# Patient Record
Sex: MALE | Race: WHITE | NOT HISPANIC OR LATINO | Employment: FULL TIME | URBAN - METROPOLITAN AREA
[De-identification: names, ages, dates, MRNs, and addresses within clinical notes are randomized per-mention and may not be internally consistent; named-entity substitution may affect disease eponyms.]

---

## 2017-02-06 ENCOUNTER — TELEPHONE (OUTPATIENT)
Dept: INFECTIOUS DISEASES | Facility: CLINIC | Age: 52
End: 2017-02-06
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
## 2017-03-31 ENCOUNTER — APPOINTMENT (OUTPATIENT)
Dept: LAB | Facility: HOSPITAL | Age: 52
End: 2017-03-31

## 2017-03-31 ENCOUNTER — OFFICE VISIT (OUTPATIENT)
Dept: INFECTIOUS DISEASES | Facility: CLINIC | Age: 52
End: 2017-03-31

## 2017-03-31 VITALS
HEART RATE: 72 BPM | HEIGHT: 68 IN | SYSTOLIC BLOOD PRESSURE: 135 MMHG | TEMPERATURE: 98.2 F | DIASTOLIC BLOOD PRESSURE: 79 MMHG | WEIGHT: 302 LBS | RESPIRATION RATE: 12 BRPM | BODY MASS INDEX: 45.77 KG/M2

## 2017-03-31 DIAGNOSIS — B20 HIV (HUMAN IMMUNODEFICIENCY VIRUS INFECTION) (HCC): Primary | ICD-10-CM

## 2017-03-31 DIAGNOSIS — K92.1 HEMATOCHEZIA: ICD-10-CM

## 2017-03-31 DIAGNOSIS — E66.09 OBESITY DUE TO EXCESS CALORIES, UNSPECIFIED OBESITY SEVERITY: ICD-10-CM

## 2017-03-31 DIAGNOSIS — R79.89 LOW TESTOSTERONE LEVEL IN MALE: ICD-10-CM

## 2017-03-31 LAB
ALBUMIN SERPL-MCNC: 4.1 G/DL (ref 3.5–5.2)
ALBUMIN/GLOB SERPL: 1.2 G/DL
ALP SERPL-CCNC: 57 U/L (ref 39–117)
ALT SERPL W P-5'-P-CCNC: 118 U/L (ref 1–41)
ANION GAP SERPL CALCULATED.3IONS-SCNC: 13.5 MMOL/L
AST SERPL-CCNC: 68 U/L (ref 1–40)
BASOPHILS # BLD AUTO: 0.01 10*3/MM3 (ref 0–0.2)
BASOPHILS NFR BLD AUTO: 0.2 % (ref 0–1.5)
BILIRUB SERPL-MCNC: 0.6 MG/DL (ref 0.1–1.2)
BUN BLD-MCNC: 14 MG/DL (ref 6–20)
BUN/CREAT SERPL: 12.7 (ref 7–25)
CALCIUM SPEC-SCNC: 9.3 MG/DL (ref 8.6–10.5)
CHLORIDE SERPL-SCNC: 101 MMOL/L (ref 98–107)
CO2 SERPL-SCNC: 22.5 MMOL/L (ref 22–29)
CREAT BLD-MCNC: 1.1 MG/DL (ref 0.76–1.27)
DEPRECATED RDW RBC AUTO: 44.4 FL (ref 37–54)
EOSINOPHIL # BLD AUTO: 0.18 10*3/MM3 (ref 0–0.7)
EOSINOPHIL NFR BLD AUTO: 2.9 % (ref 0.3–6.2)
ERYTHROCYTE [DISTWIDTH] IN BLOOD BY AUTOMATED COUNT: 13.8 % (ref 11.5–14.5)
GFR SERPL CREATININE-BSD FRML MDRD: 70 ML/MIN/1.73
GLOBULIN UR ELPH-MCNC: 3.5 GM/DL
GLUCOSE BLD-MCNC: 110 MG/DL (ref 65–99)
HCT VFR BLD AUTO: 42.1 % (ref 40.4–52.2)
HGB BLD-MCNC: 14.7 G/DL (ref 13.7–17.6)
IMM GRANULOCYTES # BLD: 0 10*3/MM3 (ref 0–0.03)
IMM GRANULOCYTES NFR BLD: 0 % (ref 0–0.5)
LYMPHOCYTES # BLD AUTO: 2.72 10*3/MM3 (ref 0.9–4.8)
LYMPHOCYTES NFR BLD AUTO: 43.1 % (ref 19.6–45.3)
MCH RBC QN AUTO: 30.8 PG (ref 27–32.7)
MCHC RBC AUTO-ENTMCNC: 34.9 G/DL (ref 32.6–36.4)
MCV RBC AUTO: 88.1 FL (ref 79.8–96.2)
MONOCYTES # BLD AUTO: 0.53 10*3/MM3 (ref 0.2–1.2)
MONOCYTES NFR BLD AUTO: 8.4 % (ref 5–12)
NEUTROPHILS # BLD AUTO: 2.87 10*3/MM3 (ref 1.9–8.1)
NEUTROPHILS NFR BLD AUTO: 45.4 % (ref 42.7–76)
PLATELET # BLD AUTO: 143 10*3/MM3 (ref 140–500)
PMV BLD AUTO: 9.4 FL (ref 6–12)
POTASSIUM BLD-SCNC: 4.2 MMOL/L (ref 3.5–5.2)
PROT SERPL-MCNC: 7.6 G/DL (ref 6–8.5)
RBC # BLD AUTO: 4.78 10*6/MM3 (ref 4.6–6)
SODIUM BLD-SCNC: 137 MMOL/L (ref 136–145)
WBC NRBC COR # BLD: 6.31 10*3/MM3 (ref 4.5–10.7)

## 2017-03-31 PROCEDURE — 85025 COMPLETE CBC W/AUTO DIFF WBC: CPT | Performed by: INTERNAL MEDICINE

## 2017-03-31 PROCEDURE — 86361 T CELL ABSOLUTE COUNT: CPT | Performed by: INTERNAL MEDICINE

## 2017-03-31 PROCEDURE — 80053 COMPREHEN METABOLIC PANEL: CPT | Performed by: INTERNAL MEDICINE

## 2017-03-31 PROCEDURE — 36415 COLL VENOUS BLD VENIPUNCTURE: CPT | Performed by: INTERNAL MEDICINE

## 2017-03-31 PROCEDURE — 87536 HIV-1 QUANT&REVRSE TRNSCRPJ: CPT | Performed by: INTERNAL MEDICINE

## 2017-03-31 PROCEDURE — 99214 OFFICE O/P EST MOD 30 MIN: CPT | Performed by: INTERNAL MEDICINE

## 2017-03-31 RX ORDER — SULFAMETHOXAZOLE AND TRIMETHOPRIM 400; 80 MG/1; MG/1
1 TABLET ORAL DAILY
COMMUNITY
End: 2017-07-31 | Stop reason: SDUPTHER

## 2017-03-31 NOTE — PROGRESS NOTES
Referring Provider: CELY Deras MD    Reason for Consultation: HIV    History of present illness:  Cornelio is a very nice 51 YOM here for f/u  HIV care. After last visit we switched him over to Truvada and dolutegravir. He is tolerating it well. He reports only 3 doses in the past 3 months. His reason for missing is falling asleep. I encouraged him to take it in the morning instead.    He continues to take the Bactrim w/o side effects.     He remains in his relationship w/ HIV - partner but they are not sexually active.    He continues to have some blood in the stool and will plan to see Dr Shaw soon.    He would like to lose some weight.    PMH:  HIV/AIDS on ART  Kaposi's sarcoma s/p chemo  Hematochezia  OA  MIAN w/ CPAP  Cellulitis of legs x 2  Obesity    PSH:  [place] tooth extraction    Social History:  No smoking  No ETOH  No illicits  Works in retail store  Has 2 dogs at home  Enjoys walking and jogging for exercise when the weather is better  No recent travel  MSM in relationship x 9 years (not sexually active at this time)    Family History:  Mom: breast cancer  Dad: MI    Allergies:  NKDA    Medications:    Current Outpatient Prescriptions:   •  dolutegravir (TIVICAY) 50 MG tablet, Take 1 tablet by mouth Daily., Disp: 30 tablet, Rfl: 5  •  emtricitabine-tenofovir (TRUVADA) 200-300 MG per tablet, Take 1 tablet by mouth daily., Disp: 90 tablet, Rfl: 1  •  sulfamethoxazole-trimethoprim (BACTRIM,SEPTRA) 400-80 MG tablet, Take 1 tablet by mouth Daily., Disp: , Rfl:   •  vitamin D (ERGOCALCIFEROL) 56224 UNITS capsule capsule, Take 50,000 Units by mouth 1 (one) time per week., Disp: , Rfl:       Review of Systems  All systems were reviewed and are negative unless otherwise stated above in the HPI    Objective   Vital Signs   Temp:  [98.2 °F (36.8 °C)] 98.2 °F (36.8 °C)  Heart Rate:  [72] 72  Resp:  [12] 12  BP: (135)/(79) 135/79    Physical Exam:   General: awake, alert, NAD, very nice   Head: Normocephalic  Eyes:  PERRL, EOMI, no scleral icterus  ENT: MMM, OP clear, no thrush. Good dentition. Large tonsils  Neck: Supple  Cardiovascular: NR, RR, no murmurs, rubs, or gallops; no LE edema  Respiratory: Lungs are clear to ascultation bilaterally, no rales or wheezing; normal work of breathing on ambient air  GI: Abdomen is obese, soft, non-tender, non-distended,  Musculoskeletal: no joint abnormalities, normal musculature  Skin: No rashes, lesions, or embolic phenomenon  Neurological: Alert and oriented x 3,  motor strength 5/5 in all four extremities  Psychiatric: Normal mood and affect   Vasc: no cyanosis    Labs:   CBC, CMP, HIV labs reviewed today  Lab Results   Component Value Date    WBC 6.5 12/29/2016    HGB 15.1 12/29/2016    HCT 43.0 12/29/2016    MCV 87 12/29/2016     12/29/2016     No results found for: GLUCOSE, BUN, CREATININE, EGFRIFNONA, EGFRIFAFRI, BCR, K, CO2, CALCIUM, PROTENTOTREF, ALBUMIN, LABIL2, AST, ALT    TSH 3.3    HIV Labs:  CD4 - 168/7% (12/2016)  VL - 10,740 (12/2016)  Genotype - wild-type (12/2016)  Hep A naive (12/2016)  Hep B sAg - negative (10/2016)  Hep B sAB negative (12/2016)  Hep C Ab - negative (10/2016)  Tspot negative (12/2016)  RPR nonreactive (12/2016)    Assessment/Plan   1. HIV  -continue Truvada and dolutegravir; stresses 100% compliance and taking medications in AM  -repeat CD4 and VL today along with CBC and CMP  -continue Truvada 200-300 mg PO daily  -encouraged Twinrix vaccines as well as Prevnar-13 and PVX-23 8 weeks later at PCP or pharmacy  -we discussed living with HIV and natural history of disease. We discussed goals of treatment and need for 100% compliance with medication and continued close follow-up. We discussed need for frequent lab monitoring and laboratory goals. We discussed risk of transmission, need to disclose status to partners, and staying safe.    2. Obesity  -encouraged weight loss  -TSH normal last visit    3. Hematochezia  -I agree w/ GI/surgery  follow-up; discussed importance w/ patient    4. Low testosterone  -his testosterone was low when checked in October  -patients with HIV are known to be predisposed to low testosterone; will ultimately defer to PCP but my recommendation would be that it is indicated in his situation      Thank you for this consult. RTC 4 months or sooner if needed.

## 2017-04-01 LAB
BASOPHILS # BLD AUTO: 0 X10E3/UL (ref 0–0.2)
BASOPHILS NFR BLD AUTO: 0 %
CD3+CD4+ CELLS # BLD: 194 /UL (ref 359–1519)
CD3+CD4+ CELLS NFR BLD: 7.2 % (ref 30.8–58.5)
EOSINOPHIL # BLD AUTO: 0.2 X10E3/UL (ref 0–0.4)
EOSINOPHIL # BLD AUTO: 3 %
ERYTHROCYTE [DISTWIDTH] IN BLOOD BY AUTOMATED COUNT: 14 % (ref 12.3–15.4)
HCT VFR BLD AUTO: 42 % (ref 37.5–51)
HGB BLD-MCNC: 14.7 G/DL (ref 12.6–17.7)
HIV1 RNA # SERPL NAA+PROBE: <20 COPIES/ML
IMM GRANULOCYTES # BLD: 0 X10E3/UL (ref 0–0.1)
IMM GRANULOCYTES NFR BLD: 0 %
LOG10 HIV-1 RNA: NORMAL LOG10COPY/ML
LYMPHOCYTES # BLD AUTO: 2.7 X10E3/UL (ref 0.7–3.1)
LYMPHOCYTES NFR BLD AUTO: 43 %
MCH RBC QN AUTO: 30.4 PG (ref 26.6–33)
MCHC RBC AUTO-ENTMCNC: 35 G/DL (ref 31.5–35.7)
MCV RBC AUTO: 87 FL (ref 79–97)
MONOCYTES # BLD AUTO: 0.5 X10E3/UL (ref 0.1–0.9)
MONOCYTES NFR BLD AUTO: 8 %
NEUTROPHILS # BLD AUTO: 2.9 X10E3/UL (ref 1.4–7)
NEUTROPHILS NFR BLD AUTO: 46 %
PLATELET # BLD AUTO: 158 X10E3/UL (ref 150–379)
RBC # BLD AUTO: 4.83 X10E6/UL (ref 4.14–5.8)
WBC # BLD AUTO: 6.3 X10E3/UL (ref 3.4–10.8)

## 2017-07-05 ENCOUNTER — RESULTS ENCOUNTER (OUTPATIENT)
Dept: INFECTIOUS DISEASES | Facility: CLINIC | Age: 52
End: 2017-07-05

## 2017-07-05 DIAGNOSIS — B20 HIV (HUMAN IMMUNODEFICIENCY VIRUS INFECTION) (HCC): Primary | ICD-10-CM

## 2017-07-05 DIAGNOSIS — B20 HIV (HUMAN IMMUNODEFICIENCY VIRUS INFECTION) (HCC): ICD-10-CM

## 2017-07-19 ENCOUNTER — LAB (OUTPATIENT)
Dept: LAB | Facility: HOSPITAL | Age: 52
End: 2017-07-19

## 2017-07-19 DIAGNOSIS — B20 HIV (HUMAN IMMUNODEFICIENCY VIRUS INFECTION) (HCC): ICD-10-CM

## 2017-07-19 LAB
ALBUMIN SERPL-MCNC: 3.9 G/DL (ref 3.5–5.2)
ALBUMIN/GLOB SERPL: 1.1 G/DL
ALP SERPL-CCNC: 59 U/L (ref 39–117)
ALT SERPL W P-5'-P-CCNC: 108 U/L (ref 1–41)
ANION GAP SERPL CALCULATED.3IONS-SCNC: 11.9 MMOL/L
AST SERPL-CCNC: 91 U/L (ref 1–40)
BASOPHILS # BLD AUTO: 0.02 10*3/MM3 (ref 0–0.2)
BASOPHILS NFR BLD AUTO: 0.3 % (ref 0–1.5)
BILIRUB SERPL-MCNC: 0.5 MG/DL (ref 0.1–1.2)
BILIRUB UR QL STRIP: NEGATIVE
BUN BLD-MCNC: 9 MG/DL (ref 6–20)
BUN/CREAT SERPL: 9.9 (ref 7–25)
CALCIUM SPEC-SCNC: 9.4 MG/DL (ref 8.6–10.5)
CHLORIDE SERPL-SCNC: 101 MMOL/L (ref 98–107)
CLARITY UR: CLEAR
CO2 SERPL-SCNC: 24.1 MMOL/L (ref 22–29)
COLOR UR: YELLOW
CREAT BLD-MCNC: 0.91 MG/DL (ref 0.76–1.27)
DEPRECATED RDW RBC AUTO: 45.8 FL (ref 37–54)
EOSINOPHIL # BLD AUTO: 0.28 10*3/MM3 (ref 0–0.7)
EOSINOPHIL NFR BLD AUTO: 4.4 % (ref 0.3–6.2)
ERYTHROCYTE [DISTWIDTH] IN BLOOD BY AUTOMATED COUNT: 13.8 % (ref 11.5–14.5)
GFR SERPL CREATININE-BSD FRML MDRD: 87 ML/MIN/1.73
GLOBULIN UR ELPH-MCNC: 3.5 GM/DL
GLUCOSE BLD-MCNC: 108 MG/DL (ref 65–99)
GLUCOSE UR STRIP-MCNC: NEGATIVE MG/DL
HBV SURFACE AB SER RIA-ACNC: NORMAL
HBV SURFACE AG SERPL QL IA: NORMAL
HCT VFR BLD AUTO: 43.6 % (ref 40.4–52.2)
HCV AB SER DONR QL: NORMAL
HGB BLD-MCNC: 14.9 G/DL (ref 13.7–17.6)
HGB UR QL STRIP.AUTO: NEGATIVE
IMM GRANULOCYTES # BLD: 0 10*3/MM3 (ref 0–0.03)
IMM GRANULOCYTES NFR BLD: 0 % (ref 0–0.5)
KETONES UR QL STRIP: NEGATIVE
LEUKOCYTE ESTERASE UR QL STRIP.AUTO: NEGATIVE
LYMPHOCYTES # BLD AUTO: 2.65 10*3/MM3 (ref 0.9–4.8)
LYMPHOCYTES NFR BLD AUTO: 42 % (ref 19.6–45.3)
MCH RBC QN AUTO: 31.2 PG (ref 27–32.7)
MCHC RBC AUTO-ENTMCNC: 34.2 G/DL (ref 32.6–36.4)
MCV RBC AUTO: 91.4 FL (ref 79.8–96.2)
MONOCYTES # BLD AUTO: 0.71 10*3/MM3 (ref 0.2–1.2)
MONOCYTES NFR BLD AUTO: 11.3 % (ref 5–12)
NEUTROPHILS # BLD AUTO: 2.65 10*3/MM3 (ref 1.9–8.1)
NEUTROPHILS NFR BLD AUTO: 42 % (ref 42.7–76)
NITRITE UR QL STRIP: NEGATIVE
PH UR STRIP.AUTO: 5.5 [PH] (ref 5–8)
PLATELET # BLD AUTO: 138 10*3/MM3 (ref 140–500)
PMV BLD AUTO: 10.2 FL (ref 6–12)
POTASSIUM BLD-SCNC: 4.1 MMOL/L (ref 3.5–5.2)
PROT SERPL-MCNC: 7.4 G/DL (ref 6–8.5)
PROT UR QL STRIP: NEGATIVE
RBC # BLD AUTO: 4.77 10*6/MM3 (ref 4.6–6)
SODIUM BLD-SCNC: 137 MMOL/L (ref 136–145)
SP GR UR STRIP: 1.02 (ref 1–1.03)
UROBILINOGEN UR QL STRIP: NORMAL
WBC NRBC COR # BLD: 6.31 10*3/MM3 (ref 4.5–10.7)

## 2017-07-19 PROCEDURE — 86706 HEP B SURFACE ANTIBODY: CPT

## 2017-07-19 PROCEDURE — 86361 T CELL ABSOLUTE COUNT: CPT

## 2017-07-19 PROCEDURE — 87536 HIV-1 QUANT&REVRSE TRNSCRPJ: CPT | Performed by: INTERNAL MEDICINE

## 2017-07-19 PROCEDURE — 86704 HEP B CORE ANTIBODY TOTAL: CPT

## 2017-07-19 PROCEDURE — 86708 HEPATITIS A ANTIBODY: CPT

## 2017-07-19 PROCEDURE — 85025 COMPLETE CBC W/AUTO DIFF WBC: CPT

## 2017-07-19 PROCEDURE — 86803 HEPATITIS C AB TEST: CPT

## 2017-07-19 PROCEDURE — 80053 COMPREHEN METABOLIC PANEL: CPT

## 2017-07-19 PROCEDURE — 81003 URINALYSIS AUTO W/O SCOPE: CPT

## 2017-07-19 PROCEDURE — 36415 COLL VENOUS BLD VENIPUNCTURE: CPT | Performed by: INTERNAL MEDICINE

## 2017-07-19 PROCEDURE — 87340 HEPATITIS B SURFACE AG IA: CPT

## 2017-07-20 ENCOUNTER — TELEPHONE (OUTPATIENT)
Dept: INFECTIOUS DISEASES | Facility: CLINIC | Age: 52
End: 2017-07-20

## 2017-07-20 LAB
BASOPHILS # BLD AUTO: 0 X10E3/UL (ref 0–0.2)
BASOPHILS NFR BLD AUTO: 0 %
CD3+CD4+ CELLS # BLD: 208 /UL (ref 359–1519)
CD3+CD4+ CELLS NFR BLD: 7.7 % (ref 30.8–58.5)
EOSINOPHIL # BLD AUTO: 0.2 X10E3/UL (ref 0–0.4)
EOSINOPHIL # BLD AUTO: 4 %
ERYTHROCYTE [DISTWIDTH] IN BLOOD BY AUTOMATED COUNT: 14.8 % (ref 12.3–15.4)
HAV AB SER QL IA: NEGATIVE
HBV CORE AB SER DONR QL IA: NEGATIVE
HCT VFR BLD AUTO: 43 % (ref 37.5–51)
HGB BLD-MCNC: 14.6 G/DL (ref 12.6–17.7)
IMM GRANULOCYTES # BLD: 0 X10E3/UL (ref 0–0.1)
IMM GRANULOCYTES NFR BLD: 0 %
LYMPHOCYTES # BLD AUTO: 2.7 X10E3/UL (ref 0.7–3.1)
LYMPHOCYTES NFR BLD AUTO: 45 %
MCH RBC QN AUTO: 29.6 PG (ref 26.6–33)
MCHC RBC AUTO-ENTMCNC: 34 G/DL (ref 31.5–35.7)
MCV RBC AUTO: 87 FL (ref 79–97)
MONOCYTES # BLD AUTO: 0.5 X10E3/UL (ref 0.1–0.9)
MONOCYTES NFR BLD AUTO: 9 %
NEUTROPHILS # BLD AUTO: 2.6 X10E3/UL (ref 1.4–7)
NEUTROPHILS NFR BLD AUTO: 42 %
PLATELET # BLD AUTO: 154 X10E3/UL (ref 150–379)
RBC # BLD AUTO: 4.93 X10E6/UL (ref 4.14–5.8)
WBC # BLD AUTO: 6.1 X10E3/UL (ref 3.4–10.8)

## 2017-07-20 NOTE — TELEPHONE ENCOUNTER
----- Message from Andrea Holm MD sent at 7/20/2017  7:47 AM EDT -----  Regarding: Labs  Please let patient know his hepatitis B test was negative. We will discuss vaccination at his next visit.       ----- Message -----     From: Lab, Background User     Sent: 7/19/2017  11:21 AM       To: Andrea Holm MD

## 2017-07-21 LAB
HIV1 RNA # SERPL NAA+PROBE: <20 COPIES/ML
LOG10 HIV-1 RNA: NORMAL LOG10COPY/ML

## 2017-07-31 ENCOUNTER — OFFICE VISIT (OUTPATIENT)
Dept: INFECTIOUS DISEASES | Facility: CLINIC | Age: 52
End: 2017-07-31

## 2017-07-31 VITALS
TEMPERATURE: 98.7 F | HEART RATE: 83 BPM | DIASTOLIC BLOOD PRESSURE: 84 MMHG | WEIGHT: 299 LBS | HEIGHT: 68 IN | BODY MASS INDEX: 45.31 KG/M2 | SYSTOLIC BLOOD PRESSURE: 142 MMHG

## 2017-07-31 DIAGNOSIS — R79.89 LOW TESTOSTERONE LEVEL IN MALE: ICD-10-CM

## 2017-07-31 DIAGNOSIS — B20 HIV (HUMAN IMMUNODEFICIENCY VIRUS INFECTION) (HCC): Primary | ICD-10-CM

## 2017-07-31 DIAGNOSIS — K92.1 HEMATOCHEZIA: ICD-10-CM

## 2017-07-31 DIAGNOSIS — K75.9 HEPATITIS: ICD-10-CM

## 2017-07-31 DIAGNOSIS — E66.09 OBESITY DUE TO EXCESS CALORIES, UNSPECIFIED OBESITY SEVERITY: ICD-10-CM

## 2017-07-31 PROCEDURE — 99214 OFFICE O/P EST MOD 30 MIN: CPT | Performed by: INTERNAL MEDICINE

## 2017-07-31 RX ORDER — SULFAMETHOXAZOLE AND TRIMETHOPRIM 400; 80 MG/1; MG/1
1 TABLET ORAL DAILY
Qty: 30 TABLET | Refills: 5 | Status: SHIPPED | OUTPATIENT
Start: 2017-07-31 | End: 2017-08-30
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
## 2017-08-01 RX ORDER — DOLUTEGRAVIR SODIUM 50 MG/1
TABLET, FILM COATED ORAL
Qty: 30 TABLET | Refills: 5 | Status: SHIPPED | OUTPATIENT
Start: 2017-08-01 | End: 2018-04-05 | Stop reason: SDUPTHER

## 2017-08-08 ENCOUNTER — TELEPHONE (OUTPATIENT)
Dept: INFECTIOUS DISEASES | Facility: CLINIC | Age: 52
End: 2017-08-08

## 2017-08-08 NOTE — TELEPHONE ENCOUNTER
I spoke with patient regarding his ultrasound appointment.  I informed him that the scheduling dept had tried to contact him on three different occasions without success.  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

## 2017-08-14 ENCOUNTER — TELEPHONE (OUTPATIENT)
Dept: INFECTIOUS DISEASES | Facility: CLINIC | Age: 52
End: 2017-08-14

## 2017-08-14 NOTE — TELEPHONE ENCOUNTER
Please let patient know that we are obtaining the ultrasound to see if he has any liver disease present. Though his liver tests may be due to fatty liver disease, it is important to make sure that there is not another cause and to make sure he is not developing cirrhosis which can be a complication of fatty liver disease.

## 2017-08-14 NOTE — TELEPHONE ENCOUNTER
Patient called in hoping to speak to you regarding his ultrasound you would like him to have done. He is wondering if it being done for the reason he sees you or for some other reason. Thanks. Krysta

## 2017-08-15 NOTE — TELEPHONE ENCOUNTER
I called patient and left him a message RE: Dr. Holm's note below. Just asked him to call us to go over the message info and to speak to Tiffany Jeffers when he calls because she has been trying to reach him RE: setting up the Ultrasound appt.--DOROTHY. RN

## 2017-08-30 ENCOUNTER — HOSPITAL ENCOUNTER (OUTPATIENT)
Dept: ULTRASOUND IMAGING | Facility: HOSPITAL | Age: 52
Discharge: HOME | End: 2017-08-30
Attending: INTERNAL MEDICINE | Admitting: INTERNAL MEDICINE

## 2017-08-30 DIAGNOSIS — K75.9 HEPATITIS: ICD-10-CM

## 2017-08-30 PROCEDURE — 76705 ECHO EXAM OF ABDOMEN: CPT

## 2017-09-05 ENCOUNTER — TELEPHONE (OUTPATIENT)
Dept: INFECTIOUS DISEASES | Facility: CLINIC | Age: 52
End: 2017-09-05

## 2017-09-05 NOTE — TELEPHONE ENCOUNTER
----- Message from Andrea Holm MD sent at 9/4/2017 10:18 AM EDT -----  Please let patient know liver ultrasound shows mild fatty liver disease as we expected. I do not think he needs to see a liver specialist.

## 2017-11-28 ENCOUNTER — TELEPHONE (OUTPATIENT)
Dept: INFECTIOUS DISEASES | Facility: CLINIC | Age: 52
End: 2017-11-28

## 2017-11-28 NOTE — TELEPHONE ENCOUNTER
Patient called in and was needing to reschedule, he is now off on Wednesdays, you had a couple of Wednesday's open this month but I know that's not a normal day for you so I wanted to ask before scheduling him. Also, he wanted to know if you were going to do labs/urine on the day of his appt. Thanks. Krysta

## 2017-11-28 NOTE — TELEPHONE ENCOUNTER
12/13 at 1:10 or 12/27 at 1:30 would work. Yes, he will be providing blood and urine but does NOT need to be fasting.

## 2017-11-28 NOTE — TELEPHONE ENCOUNTER
Rescheduled for 12/27 @ 1:30. Let him know blood/urine species would be expected to be collected. Krysta

## 2017-12-21 RX ORDER — SULFAMETHOXAZOLE AND TRIMETHOPRIM 400; 80 MG/1; MG/1
TABLET ORAL
Qty: 30 TABLET | Refills: 3 | Status: SHIPPED | OUTPATIENT
Start: 2017-12-21 | End: 2018-01-20

## 2017-12-27 ENCOUNTER — OFFICE VISIT (OUTPATIENT)
Dept: INFECTIOUS DISEASES | Facility: CLINIC | Age: 52
End: 2017-12-27

## 2017-12-27 ENCOUNTER — APPOINTMENT (OUTPATIENT)
Dept: LAB | Facility: HOSPITAL | Age: 52
End: 2017-12-27

## 2017-12-27 VITALS
TEMPERATURE: 98.1 F | HEIGHT: 68 IN | BODY MASS INDEX: 47.47 KG/M2 | HEART RATE: 71 BPM | SYSTOLIC BLOOD PRESSURE: 137 MMHG | WEIGHT: 313.2 LBS | DIASTOLIC BLOOD PRESSURE: 84 MMHG

## 2017-12-27 DIAGNOSIS — B20 HIV (HUMAN IMMUNODEFICIENCY VIRUS INFECTION) (HCC): Primary | ICD-10-CM

## 2017-12-27 DIAGNOSIS — K75.9 HEPATITIS: ICD-10-CM

## 2017-12-27 DIAGNOSIS — K76.0 FATTY LIVER: ICD-10-CM

## 2017-12-27 DIAGNOSIS — R79.89 LOW TESTOSTERONE IN MALE: ICD-10-CM

## 2017-12-27 DIAGNOSIS — K92.1 HEMATOCHEZIA: ICD-10-CM

## 2017-12-27 DIAGNOSIS — E66.09 CLASS 2 OBESITY DUE TO EXCESS CALORIES WITHOUT SERIOUS COMORBIDITY WITH BODY MASS INDEX (BMI) OF 35.0 TO 35.9 IN ADULT: ICD-10-CM

## 2017-12-27 DIAGNOSIS — R79.89 LOW SERUM VITAMIN D: ICD-10-CM

## 2017-12-27 LAB
25(OH)D3 SERPL-MCNC: 18.4 NG/ML (ref 30–100)
ALBUMIN SERPL-MCNC: 4.3 G/DL (ref 3.5–5.2)
ALBUMIN/GLOB SERPL: 1.1 G/DL
ALP SERPL-CCNC: 58 U/L (ref 39–117)
ALT SERPL W P-5'-P-CCNC: 90 U/L (ref 1–41)
ANION GAP SERPL CALCULATED.3IONS-SCNC: 11.3 MMOL/L
AST SERPL-CCNC: 81 U/L (ref 1–40)
BASOPHILS # BLD AUTO: 0.02 10*3/MM3 (ref 0–0.2)
BASOPHILS NFR BLD AUTO: 0.3 % (ref 0–1.5)
BILIRUB SERPL-MCNC: 0.6 MG/DL (ref 0.1–1.2)
BUN BLD-MCNC: 12 MG/DL (ref 6–20)
BUN/CREAT SERPL: 11.8 (ref 7–25)
CALCIUM SPEC-SCNC: 9.4 MG/DL (ref 8.6–10.5)
CHLORIDE SERPL-SCNC: 101 MMOL/L (ref 98–107)
CO2 SERPL-SCNC: 25.7 MMOL/L (ref 22–29)
CREAT BLD-MCNC: 1.02 MG/DL (ref 0.76–1.27)
DEPRECATED RDW RBC AUTO: 45.1 FL (ref 37–54)
EOSINOPHIL # BLD AUTO: 0.19 10*3/MM3 (ref 0–0.7)
EOSINOPHIL NFR BLD AUTO: 2.8 % (ref 0.3–6.2)
ERYTHROCYTE [DISTWIDTH] IN BLOOD BY AUTOMATED COUNT: 13.9 % (ref 11.5–14.5)
GFR SERPL CREATININE-BSD FRML MDRD: 76 ML/MIN/1.73
GLOBULIN UR ELPH-MCNC: 3.8 GM/DL
GLUCOSE BLD-MCNC: 96 MG/DL (ref 65–99)
HCT VFR BLD AUTO: 43.2 % (ref 40.4–52.2)
HGB BLD-MCNC: 15 G/DL (ref 13.7–17.6)
IMM GRANULOCYTES # BLD: 0 10*3/MM3 (ref 0–0.03)
IMM GRANULOCYTES NFR BLD: 0 % (ref 0–0.5)
LYMPHOCYTES # BLD AUTO: 3.01 10*3/MM3 (ref 0.9–4.8)
LYMPHOCYTES NFR BLD AUTO: 44.5 % (ref 19.6–45.3)
MCH RBC QN AUTO: 31.3 PG (ref 27–32.7)
MCHC RBC AUTO-ENTMCNC: 34.7 G/DL (ref 32.6–36.4)
MCV RBC AUTO: 90 FL (ref 79.8–96.2)
MONOCYTES # BLD AUTO: 0.54 10*3/MM3 (ref 0.2–1.2)
MONOCYTES NFR BLD AUTO: 8 % (ref 5–12)
NEUTROPHILS # BLD AUTO: 3 10*3/MM3 (ref 1.9–8.1)
NEUTROPHILS NFR BLD AUTO: 44.4 % (ref 42.7–76)
PLATELET # BLD AUTO: 154 10*3/MM3 (ref 140–500)
PMV BLD AUTO: 9.9 FL (ref 6–12)
POTASSIUM BLD-SCNC: 4.2 MMOL/L (ref 3.5–5.2)
PROT SERPL-MCNC: 8.1 G/DL (ref 6–8.5)
RBC # BLD AUTO: 4.8 10*6/MM3 (ref 4.6–6)
SODIUM BLD-SCNC: 138 MMOL/L (ref 136–145)
WBC NRBC COR # BLD: 6.76 10*3/MM3 (ref 4.5–10.7)

## 2017-12-27 PROCEDURE — 86361 T CELL ABSOLUTE COUNT: CPT | Performed by: INTERNAL MEDICINE

## 2017-12-27 PROCEDURE — 86481 TB AG RESPONSE T-CELL SUSP: CPT | Performed by: INTERNAL MEDICINE

## 2017-12-27 PROCEDURE — 36415 COLL VENOUS BLD VENIPUNCTURE: CPT | Performed by: INTERNAL MEDICINE

## 2017-12-27 PROCEDURE — 99214 OFFICE O/P EST MOD 30 MIN: CPT | Performed by: INTERNAL MEDICINE

## 2017-12-27 PROCEDURE — 82306 VITAMIN D 25 HYDROXY: CPT | Performed by: INTERNAL MEDICINE

## 2017-12-27 PROCEDURE — 80053 COMPREHEN METABOLIC PANEL: CPT | Performed by: INTERNAL MEDICINE

## 2017-12-27 PROCEDURE — 86592 SYPHILIS TEST NON-TREP QUAL: CPT | Performed by: INTERNAL MEDICINE

## 2017-12-27 PROCEDURE — 85025 COMPLETE CBC W/AUTO DIFF WBC: CPT | Performed by: INTERNAL MEDICINE

## 2017-12-27 RX ORDER — FLUTICASONE PROPIONATE 50 MCG
2 SPRAY, SUSPENSION (ML) NASAL DAILY
Qty: 1 BOTTLE | Refills: 3 | Status: SHIPPED | OUTPATIENT
Start: 2017-12-27 | End: 2018-01-26
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
## 2017-12-28 LAB
BASOPHILS # BLD AUTO: 0 X10E3/UL (ref 0–0.2)
BASOPHILS NFR BLD AUTO: 0 %
CD3+CD4+ CELLS # BLD: 264 /UL (ref 359–1519)
CD3+CD4+ CELLS NFR BLD: 8.5 % (ref 30.8–58.5)
EOSINOPHIL # BLD AUTO: 0.2 X10E3/UL (ref 0–0.4)
EOSINOPHIL # BLD AUTO: 3 %
ERYTHROCYTE [DISTWIDTH] IN BLOOD BY AUTOMATED COUNT: 13.8 % (ref 12.3–15.4)
HCT VFR BLD AUTO: 43.4 % (ref 37.5–51)
HGB BLD-MCNC: 15.2 G/DL (ref 13–17.7)
IMM GRANULOCYTES # BLD: 0 X10E3/UL (ref 0–0.1)
IMM GRANULOCYTES NFR BLD: 0 %
LYMPHOCYTES # BLD AUTO: 3.1 X10E3/UL (ref 0.7–3.1)
LYMPHOCYTES NFR BLD AUTO: 45 %
MCH RBC QN AUTO: 31.1 PG (ref 26.6–33)
MCHC RBC AUTO-ENTMCNC: 35 G/DL (ref 31.5–35.7)
MCV RBC AUTO: 89 FL (ref 79–97)
MONOCYTES # BLD AUTO: 0.5 X10E3/UL (ref 0.1–0.9)
MONOCYTES NFR BLD AUTO: 7 %
NEUTROPHILS # BLD AUTO: 3.1 X10E3/UL (ref 1.4–7)
NEUTROPHILS NFR BLD AUTO: 45 %
PLATELET # BLD AUTO: 164 X10E3/UL (ref 150–379)
RBC # BLD AUTO: 4.89 X10E6/UL (ref 4.14–5.8)
RPR SER QL: NORMAL
WBC # BLD AUTO: 6.9 X10E3/UL (ref 3.4–10.8)

## 2017-12-28 PROCEDURE — 90632 HEPA VACCINE ADULT IM: CPT | Performed by: INTERNAL MEDICINE

## 2017-12-28 PROCEDURE — 90746 HEPB VACCINE 3 DOSE ADULT IM: CPT | Performed by: INTERNAL MEDICINE

## 2017-12-28 PROCEDURE — 90471 IMMUNIZATION ADMIN: CPT | Performed by: INTERNAL MEDICINE

## 2017-12-28 PROCEDURE — 90472 IMMUNIZATION ADMIN EACH ADD: CPT | Performed by: INTERNAL MEDICINE

## 2017-12-29 ENCOUNTER — RESULTS ENCOUNTER (OUTPATIENT)
Dept: INFECTIOUS DISEASES | Facility: CLINIC | Age: 52
End: 2017-12-29

## 2017-12-29 DIAGNOSIS — B20 HIV (HUMAN IMMUNODEFICIENCY VIRUS INFECTION) (HCC): Primary | ICD-10-CM

## 2017-12-29 DIAGNOSIS — B20 HIV (HUMAN IMMUNODEFICIENCY VIRUS INFECTION) (HCC): ICD-10-CM

## 2017-12-29 LAB
TSPOT INTERPRETATION: NEGATIVE
TSPOT NIL CONTROL: 0
TSPOT PANEL A: 0
TSPOT PANEL B: 0
TSPOT POS CONTROL: 84

## 2018-01-18 ENCOUNTER — TELEPHONE (OUTPATIENT)
Dept: INFECTIOUS DISEASES | Facility: CLINIC | Age: 53
End: 2018-01-18

## 2018-01-18 NOTE — TELEPHONE ENCOUNTER
Patient called back to office stated that he would call and make appointment this week with gastro.

## 2018-01-18 NOTE — TELEPHONE ENCOUNTER
LVM for patient to please contact our office.  I need to know if the patient would like a referral to gasto or would choose not to be seen by gastro.  The gastro office has left the patient 3 separate voice messages in order to try to schedule him for an appointment.  Gastro's number is [phone]

## 2018-01-26 ENCOUNTER — TELEPHONE (OUTPATIENT)
Dept: INFECTIOUS DISEASES | Facility: CLINIC | Age: 53
End: 2018-01-26

## 2018-01-26 NOTE — TELEPHONE ENCOUNTER
Canceled patients referral to gastro at this time;  Gastro attempted 3 x's to contact patient and pt never called to schedule.

## 2018-02-13 ENCOUNTER — TELEPHONE (OUTPATIENT)
Dept: INFECTIOUS DISEASES | Facility: CLINIC | Age: 53
End: 2018-02-13

## 2018-02-13 NOTE — TELEPHONE ENCOUNTER
LVM reminding him that he was due for his 2nd Hep B injection on 01/27 and to please call our office to schedule a nurses appt in order to stay current with the Hep B schedule.

## 2018-02-15 ENCOUNTER — CLINICAL SUPPORT (OUTPATIENT)
Dept: INFECTIOUS DISEASES | Facility: CLINIC | Age: 53
End: 2018-02-15

## 2018-02-15 ENCOUNTER — APPOINTMENT (OUTPATIENT)
Dept: LAB | Facility: HOSPITAL | Age: 53
End: 2018-02-15

## 2018-02-15 PROCEDURE — 36415 COLL VENOUS BLD VENIPUNCTURE: CPT | Performed by: INTERNAL MEDICINE

## 2018-02-15 PROCEDURE — 90746 HEPB VACCINE 3 DOSE ADULT IM: CPT | Performed by: INTERNAL MEDICINE

## 2018-02-15 PROCEDURE — 90471 IMMUNIZATION ADMIN: CPT | Performed by: INTERNAL MEDICINE

## 2018-02-15 PROCEDURE — 87536 HIV-1 QUANT&REVRSE TRNSCRPJ: CPT | Performed by: INTERNAL MEDICINE

## 2018-02-19 LAB
HIV1 RNA # SERPL NAA+PROBE: <20 COPIES/ML
LOG10 HIV-1 RNA: NORMAL LOG10COPY/ML

## 2018-03-29 ENCOUNTER — TELEPHONE (OUTPATIENT)
Dept: INFECTIOUS DISEASES | Facility: CLINIC | Age: 53
End: 2018-03-29

## 2018-03-29 NOTE — TELEPHONE ENCOUNTER
Patient called in and stated that over the course of the past several days he has been experiencing some light headedness and wanted to check to make sure it maybe wasn't a side effect he may be experiencing from his medication you have prescribed or if he needs to follow up with his PCP? No other symptoms to report. Thanks. Krysta

## 2018-03-29 NOTE — TELEPHONE ENCOUNTER
Please tell him thanks for letting me know. However, I'm not familiar with his HIV meds causing light headedness. I would recommend seeing his PCP about it as you mentioned.

## 2018-03-30 ENCOUNTER — TELEPHONE (OUTPATIENT)
Dept: INFECTIOUS DISEASES | Facility: CLINIC | Age: 53
End: 2018-03-30

## 2018-03-30 NOTE — TELEPHONE ENCOUNTER
Phone with patient, He called us back and said he will contact PCP for dizziness episodes although they are somewhat better. I informed him, per Dr. Holm, that the med's we have him on do NOT generally cause dizziness. Jayla Deutsch RN

## 2018-03-30 NOTE — TELEPHONE ENCOUNTER
Have left numerous messages for Mr. Viera to call us back but so far, we have not heard back from him. Will await call from patient. Jayla Deutsch RN

## 2018-04-05 RX ORDER — DOLUTEGRAVIR SODIUM 50 MG/1
TABLET, FILM COATED ORAL
Qty: 30 TABLET | Refills: 5 | Status: SHIPPED | OUTPATIENT
Start: 2018-04-05 | End: 2019-01-24 | Stop reason: SDUPTHER

## 2018-04-05 RX ORDER — EMTRICITABINE AND TENOFOVIR ALAFENAMIDE 200; 25 MG/1; MG/1
TABLET ORAL
Qty: 30 TABLET | Refills: 5 | Status: SHIPPED | OUTPATIENT
Start: 2018-04-05 | End: 2019-01-24 | Stop reason: SDUPTHER

## 2018-06-19 ENCOUNTER — TELEPHONE (OUTPATIENT)
Dept: INFECTIOUS DISEASES | Facility: CLINIC | Age: 53
End: 2018-06-19

## 2018-06-19 NOTE — TELEPHONE ENCOUNTER
LVM informing pt that he will be due the week of 06/25 for final vaccines of Hep A & B series; please call and inform our FOC what day he will be coming, so that an order can be placed.

## 2018-06-28 ENCOUNTER — OFFICE VISIT (OUTPATIENT)
Dept: INFECTIOUS DISEASES | Facility: CLINIC | Age: 53
End: 2018-06-28

## 2018-06-28 ENCOUNTER — TELEPHONE (OUTPATIENT)
Dept: INFECTIOUS DISEASES | Facility: CLINIC | Age: 53
End: 2018-06-28

## 2018-06-28 VITALS
HEART RATE: 72 BPM | HEIGHT: 68 IN | DIASTOLIC BLOOD PRESSURE: 72 MMHG | TEMPERATURE: 97.9 F | WEIGHT: 306.8 LBS | BODY MASS INDEX: 46.5 KG/M2 | SYSTOLIC BLOOD PRESSURE: 124 MMHG

## 2018-06-28 DIAGNOSIS — B20 HIV (HUMAN IMMUNODEFICIENCY VIRUS INFECTION) (HCC): Primary | ICD-10-CM

## 2018-06-28 DIAGNOSIS — K92.1 HEMATOCHEZIA: ICD-10-CM

## 2018-06-28 DIAGNOSIS — K76.0 FATTY LIVER: ICD-10-CM

## 2018-06-28 PROCEDURE — 90472 IMMUNIZATION ADMIN EACH ADD: CPT | Performed by: INTERNAL MEDICINE

## 2018-06-28 PROCEDURE — 99213 OFFICE O/P EST LOW 20 MIN: CPT | Performed by: INTERNAL MEDICINE

## 2018-06-28 PROCEDURE — 90471 IMMUNIZATION ADMIN: CPT | Performed by: INTERNAL MEDICINE

## 2018-06-28 PROCEDURE — 90632 HEPA VACCINE ADULT IM: CPT | Performed by: INTERNAL MEDICINE

## 2018-06-28 PROCEDURE — 90746 HEPB VACCINE 3 DOSE ADULT IM: CPT | Performed by: INTERNAL MEDICINE

## 2018-06-28 RX ORDER — ERGOCALCIFEROL 1.25 MG/1
CAPSULE ORAL
Qty: 12 CAPSULE | Refills: 0 | Status: SHIPPED | OUTPATIENT
Start: 2018-06-28 | End: 2021-10-04

## 2018-06-28 RX ORDER — OXYMETAZOLINE HYDROCHLORIDE 0.05 G/100ML
2 SPRAY NASAL 2 TIMES DAILY
COMMUNITY

## 2018-06-28 RX ORDER — ERGOCALCIFEROL 1.25 MG/1
50000 CAPSULE ORAL
Qty: 8 CAPSULE | Refills: 0 | Status: SHIPPED | OUTPATIENT
Start: 2018-06-28 | End: 2018-06-28 | Stop reason: SDUPTHER

## 2018-06-28 NOTE — PROGRESS NOTES
"CC: f/u HIV    History of present illness:  Cornelio is a very nice 52 YOM here for f/u HIV care. He continues to tolerate well the Descovy (TAF/FTC) and Tivicay (DTG). He denies missed doses. Still getting refills through [facility] without any delays or cost troubles. No side effects of medications.    His weight is down 7# since last visit. He isn't sure why.    He has rare bleeding in the stools. He wants another GI referral.    He would like to supplement Vit D.    The Flonase did not help w/ his allergies.      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  Enjoys walking and jogging for exercise when the weather is better  No recent travel  MSM in relationship  But not sexually active at this time    Family History:  Mom: breast cancer  Dad: MI    Allergies:  NKDA    Medications:    Current Outpatient Prescriptions:   •  azithromycin (ZITHROMAX) 250 MG tablet, Take 2 tablets the first day, then 1 tablet daily for 4 days., Disp: 6 tablet, Rfl: 0  •  DESCOVY 200-25 MG tablet, TAKE 1 TABLET BY MOUTH DAILY, Disp: 30 tablet, Rfl: 5  •  Emtricitabine-Tenofovir AF (DESCOVY PO), Take  by mouth., Disp: , Rfl:   •  oxymetazoline (AFRIN) 0.05 % nasal spray, 2 sprays into each nostril 2 (Two) Times a Day., Disp: , Rfl:   •  TIVICAY 50 MG tablet, TAKE 1 TABLET BY MOUTH DAILY, Disp: 30 tablet, Rfl: 5  •  vitamin D (ERGOCALCIFEROL) 00207 UNITS capsule capsule, Take 50,000 Units by mouth 1 (one) time per week., Disp: , Rfl:     Objective   Vital Signs   Temp:  [97.9 °F (36.6 °C)] 97.9 °F (36.6 °C)  Heart Rate:  [72] 72  BP: (124)/(72) 124/72 /72   Pulse 72   Temp 97.9 °F (36.6 °C)   Ht 172.7 cm (67.99\")   Wt (!) 139 kg (306 lb 12.8 oz)   BMI 46.66 kg/m²     Physical Exam:   General: awake, " alert, NAD  Head: Normocephalic  Eyes: no scleral icterus  ENT: MMM, OP clear, no thrush. Good dentition. Large tonsils  Neck: Supple  Cardiovascular: NR, no LE edema  Respiratory: Lungs are clear to ascultation bilaterally, no rales or wheezing; normal work of breathing on ambient air  GI: Abdomen is obese, soft, non-tender, non-distended,  Musculoskeletal: no joint abnormalities, normal musculature  Skin: No rashes, lesions, or embolic phenomenon  Neurological: Alert and oriented x 3,  motor strength 5/5 in all four extremities  Psychiatric: Normal mood and affect   Vasc: no cyanosis    Labs:   CBC, CMP, HIV labs reviewed today  Lab Results   Component Value Date    WBC 6.9 12/27/2017    WBC 6.76 12/27/2017    HGB 15.2 12/27/2017    HGB 15.0 12/27/2017    HCT 43.4 12/27/2017    HCT 43.2 12/27/2017    MCV 89 12/27/2017    MCV 90.0 12/27/2017     12/27/2017     12/27/2017     Lab Results   Component Value Date    GLUCOSE 96 12/27/2017    BUN 12 12/27/2017    CREATININE 1.02 12/27/2017    EGFRIFNONA 76 12/27/2017    BCR 11.8 12/27/2017    K 4.2 12/27/2017    CO2 25.7 12/27/2017    CALCIUM 9.4 12/27/2017    ALBUMIN 4.30 12/27/2017    LABIL2 1.1 12/27/2017    AST 81 (H) 12/27/2017    ALT 90 (H) 12/27/2017     Vit D 18    HIV Labs:  CD4 - 264/8% (12/2017)  VL - undetectable (2/2018)  Genotype - wild-type (12/2016)  Hep A naive (7/2017)  Hep B sAg - negative (7/2017)  Hep B sAB negative (7/2017)  Hep C Ab - negative (7/2017)  Tspot negative (12/2017)  RPR nonreactive (12/2017)    Assessment/Plan   1. HIV  -continue Descovy (TAF/FTC) and dolutegravir 50 mg PO daily through [facility]; stressed 100% compliance; will consider Biktarvy at future visit  -repeat CD4 and VL today along with CBC, CMP and Hep C Ab  -stop Bactrim for PCP prophylaxis given CD4 > 200 for appropriate length of time  -Hep A #2 and Hep B #3 today; needs to get Prenvar-13 at PCP followed by PVX-23 8 weeks later at PCP  -check Hep B sAb  next visit to confirm immunity after vaccination  -will plan to get DEXA scan at future visit since he is over 50 years old and has HIV; I want to prioritize some other things first ie colonoscopy  -we discussed living with HIV and natural history of disease. We discussed goals of treatment and need for 100% compliance with medication and continued close follow-up. We discussed need for frequent lab monitoring and laboratory goals. We discussed risk of transmission, need to disclose status to partners, and staying safe.    2. Obesity    3. Hematochezia  -requesting a different GI group so order placed    4. Low testosterone  -patients with HIV are known to be predisposed to low testosterone; will ultimately defer to PCP but my recommendation would be that it is indicated in his situation    5. Hepatitis secondary to fatty liver  -viral testing negative    RTC 6 months or sooner if needed.

## 2018-07-02 ENCOUNTER — TELEPHONE (OUTPATIENT)
Dept: INFECTIOUS DISEASES | Facility: CLINIC | Age: 53
End: 2018-07-02

## 2018-07-02 NOTE — TELEPHONE ENCOUNTER
Patient called in and I gave him the information our MA left in his chart regarding his new pt appt with Dr Vance. Told him also if he needed to reschedule that appt to reach out to them. Krysta

## 2018-07-19 ENCOUNTER — TELEPHONE (OUTPATIENT)
Dept: INFECTIOUS DISEASES | Facility: CLINIC | Age: 53
End: 2018-07-19

## 2018-07-19 NOTE — TELEPHONE ENCOUNTER
LVM requesting that patient please contact our office to let us know when/where he will have labs drawn that were ordered by Dr. Holm on 06/28/18.

## 2018-07-25 NOTE — TELEPHONE ENCOUNTER
Patient states that he will come to lab on 08/03/18 for active labs that need to be drawn that were ordered by Dr. Holm on 06/28/18.

## 2018-07-27 ENCOUNTER — APPOINTMENT (OUTPATIENT)
Dept: LAB | Facility: HOSPITAL | Age: 53
End: 2018-07-27

## 2018-07-27 LAB
ALBUMIN SERPL-MCNC: 4.4 G/DL (ref 3.5–5.2)
ALBUMIN/GLOB SERPL: 1.3 G/DL
ALP SERPL-CCNC: 51 U/L (ref 39–117)
ALT SERPL W P-5'-P-CCNC: 102 U/L (ref 1–41)
ANION GAP SERPL CALCULATED.3IONS-SCNC: 11.9 MMOL/L
AST SERPL-CCNC: 75 U/L (ref 1–40)
BASOPHILS # BLD AUTO: 0.02 10*3/MM3 (ref 0–0.2)
BASOPHILS NFR BLD AUTO: 0.3 % (ref 0–1.5)
BILIRUB SERPL-MCNC: 0.5 MG/DL (ref 0.1–1.2)
BUN BLD-MCNC: 12 MG/DL (ref 6–20)
BUN/CREAT SERPL: 9.5 (ref 7–25)
CALCIUM SPEC-SCNC: 9.2 MG/DL (ref 8.6–10.5)
CHLORIDE SERPL-SCNC: 100 MMOL/L (ref 98–107)
CO2 SERPL-SCNC: 25.1 MMOL/L (ref 22–29)
CREAT BLD-MCNC: 1.26 MG/DL (ref 0.76–1.27)
DEPRECATED RDW RBC AUTO: 44.8 FL (ref 37–54)
EOSINOPHIL # BLD AUTO: 0.2 10*3/MM3 (ref 0–0.7)
EOSINOPHIL NFR BLD AUTO: 3.2 % (ref 0.3–6.2)
ERYTHROCYTE [DISTWIDTH] IN BLOOD BY AUTOMATED COUNT: 13.7 % (ref 11.5–14.5)
GFR SERPL CREATININE-BSD FRML MDRD: 60 ML/MIN/1.73
GLOBULIN UR ELPH-MCNC: 3.5 GM/DL
GLUCOSE BLD-MCNC: 103 MG/DL (ref 65–99)
HCT VFR BLD AUTO: 44.6 % (ref 40.4–52.2)
HCV AB SER DONR QL: NORMAL
HGB BLD-MCNC: 14.8 G/DL (ref 13.7–17.6)
IMM GRANULOCYTES # BLD: 0 10*3/MM3 (ref 0–0.03)
IMM GRANULOCYTES NFR BLD: 0 % (ref 0–0.5)
LYMPHOCYTES # BLD AUTO: 3.06 10*3/MM3 (ref 0.9–4.8)
LYMPHOCYTES NFR BLD AUTO: 48.4 % (ref 19.6–45.3)
MCH RBC QN AUTO: 30.1 PG (ref 27–32.7)
MCHC RBC AUTO-ENTMCNC: 33.2 G/DL (ref 32.6–36.4)
MCV RBC AUTO: 90.8 FL (ref 79.8–96.2)
MONOCYTES # BLD AUTO: 0.53 10*3/MM3 (ref 0.2–1.2)
MONOCYTES NFR BLD AUTO: 8.4 % (ref 5–12)
NEUTROPHILS # BLD AUTO: 2.51 10*3/MM3 (ref 1.9–8.1)
NEUTROPHILS NFR BLD AUTO: 39.7 % (ref 42.7–76)
PLATELET # BLD AUTO: 155 10*3/MM3 (ref 140–500)
PMV BLD AUTO: 10.3 FL (ref 6–12)
POTASSIUM BLD-SCNC: 4.1 MMOL/L (ref 3.5–5.2)
PROT SERPL-MCNC: 7.9 G/DL (ref 6–8.5)
RBC # BLD AUTO: 4.91 10*6/MM3 (ref 4.6–6)
SODIUM BLD-SCNC: 137 MMOL/L (ref 136–145)
WBC NRBC COR # BLD: 6.32 10*3/MM3 (ref 4.5–10.7)

## 2018-07-27 PROCEDURE — 87536 HIV-1 QUANT&REVRSE TRNSCRPJ: CPT | Performed by: INTERNAL MEDICINE

## 2018-07-27 PROCEDURE — 85025 COMPLETE CBC W/AUTO DIFF WBC: CPT | Performed by: INTERNAL MEDICINE

## 2018-07-27 PROCEDURE — 86803 HEPATITIS C AB TEST: CPT | Performed by: INTERNAL MEDICINE

## 2018-07-27 PROCEDURE — 86361 T CELL ABSOLUTE COUNT: CPT | Performed by: INTERNAL MEDICINE

## 2018-07-27 PROCEDURE — 36415 COLL VENOUS BLD VENIPUNCTURE: CPT | Performed by: INTERNAL MEDICINE

## 2018-07-27 PROCEDURE — 80053 COMPREHEN METABOLIC PANEL: CPT | Performed by: INTERNAL MEDICINE

## 2018-07-28 LAB
BASOPHILS # BLD AUTO: 0 X10E3/UL (ref 0–0.2)
BASOPHILS NFR BLD AUTO: 0 %
CD3+CD4+ CELLS # BLD: 288 /UL (ref 359–1519)
CD3+CD4+ CELLS NFR BLD: 9 % (ref 30.8–58.5)
EOSINOPHIL # BLD AUTO: 0.2 X10E3/UL (ref 0–0.4)
EOSINOPHIL # BLD AUTO: 3 %
ERYTHROCYTE [DISTWIDTH] IN BLOOD BY AUTOMATED COUNT: 14.5 % (ref 12.3–15.4)
HCT VFR BLD AUTO: 42.2 % (ref 37.5–51)
HGB BLD-MCNC: 14.7 G/DL (ref 13–17.7)
IMM GRANULOCYTES # BLD: 0 X10E3/UL (ref 0–0.1)
IMM GRANULOCYTES NFR BLD: 0 %
LYMPHOCYTES # BLD AUTO: 3.2 X10E3/UL (ref 0.7–3.1)
LYMPHOCYTES NFR BLD AUTO: 49 %
MCH RBC QN AUTO: 30.4 PG (ref 26.6–33)
MCHC RBC AUTO-ENTMCNC: 34.8 G/DL (ref 31.5–35.7)
MCV RBC AUTO: 87 FL (ref 79–97)
MONOCYTES # BLD AUTO: 0.5 X10E3/UL (ref 0.1–0.9)
MONOCYTES NFR BLD AUTO: 8 %
NEUTROPHILS # BLD AUTO: 2.5 X10E3/UL (ref 1.4–7)
NEUTROPHILS NFR BLD AUTO: 40 %
PLATELET # BLD AUTO: 165 X10E3/UL (ref 150–379)
RBC # BLD AUTO: 4.83 X10E6/UL (ref 4.14–5.8)
WBC # BLD AUTO: 6.4 X10E3/UL (ref 3.4–10.8)

## 2018-07-29 LAB
HIV1 RNA # SERPL NAA+PROBE: <20 COPIES/ML
LOG10 HIV-1 RNA: NORMAL LOG10COPY/ML

## 2018-07-30 ENCOUNTER — TELEPHONE (OUTPATIENT)
Dept: INFECTIOUS DISEASES | Facility: CLINIC | Age: 53
End: 2018-07-30

## 2018-07-30 NOTE — TELEPHONE ENCOUNTER
Informed patient of his lab results per Dr. Holm.  Patient wanted to make Dr. 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

## 2018-07-30 NOTE — TELEPHONE ENCOUNTER
----- Message from Andrea Holm MD sent at 7/30/2018  9:51 AM EDT -----  Please let patient know his CD4 count has increased to 288 and that his HIV viral load remains undetectable. His kidney function is in the normal range. His liver tests remain slightly elevated.    I will see him again as planned in 6 months.

## 2018-08-04 ENCOUNTER — TELEPHONE (OUTPATIENT)
Dept: URGENT CARE | Facility: CLINIC | Age: 53
End: 2018-08-04

## 2018-08-04 NOTE — TELEPHONE ENCOUNTER
Patient called stating he is still having rash with discomfort. Asking for refill of acyclovir, Per Dr Cordova patient needs to see PCP or specialist for refills of that.

## 2018-08-06 ENCOUNTER — TELEPHONE (OUTPATIENT)
Dept: INFECTIOUS DISEASES | Facility: CLINIC | Age: 53
End: 2018-08-06

## 2018-08-06 NOTE — TELEPHONE ENCOUNTER
Pt had called on Saturday to the after hours line after he spoke with Urgent Care and he is looking for you to refill his valtrex that he got from  that he saw for a rash as he is still having the rash. Asked if he reached out to his PCP and he stated all the PCP's you recommended to him do not have availability till October. Thanks. Krysta

## 2018-08-29 ENCOUNTER — OFFICE (OUTPATIENT)
Dept: URBAN - METROPOLITAN AREA CLINIC 66 | Facility: CLINIC | Age: 53
End: 2018-08-29

## 2018-08-29 VITALS
DIASTOLIC BLOOD PRESSURE: 70 MMHG | HEART RATE: 64 BPM | HEIGHT: 68 IN | SYSTOLIC BLOOD PRESSURE: 108 MMHG | WEIGHT: 305 LBS

## 2018-08-29 DIAGNOSIS — K62.5 HEMORRHAGE OF ANUS AND RECTUM: ICD-10-CM

## 2018-08-29 PROCEDURE — 99242 OFF/OP CONSLTJ NEW/EST SF 20: CPT | Performed by: INTERNAL MEDICINE

## 2018-09-27 ENCOUNTER — RESULTS ENCOUNTER (OUTPATIENT)
Dept: FAMILY MEDICINE CLINIC | Facility: CLINIC | Age: 53
End: 2018-09-27

## 2018-09-27 DIAGNOSIS — R53.82 CHRONIC FATIGUE: ICD-10-CM

## 2018-09-27 DIAGNOSIS — M16.0 PRIMARY OSTEOARTHRITIS OF BOTH HIPS: ICD-10-CM

## 2018-09-27 DIAGNOSIS — E55.9 VITAMIN D DEFICIENCY: ICD-10-CM

## 2018-09-27 DIAGNOSIS — E34.9 TESTOSTERONE DEFICIENCY: ICD-10-CM

## 2018-09-27 DIAGNOSIS — K62.5 RECTAL BLEEDING: ICD-10-CM

## 2018-09-27 DIAGNOSIS — B20 HUMAN IMMUNODEFICIENCY VIRUS (HIV) INFECTION (HCC): Chronic | ICD-10-CM

## 2018-09-27 DIAGNOSIS — K21.00 GASTROESOPHAGEAL REFLUX DISEASE WITH ESOPHAGITIS: ICD-10-CM

## 2018-10-22 ENCOUNTER — AMBULATORY SURGICAL CENTER (OUTPATIENT)
Dept: URBAN - METROPOLITAN AREA SURGERY 20 | Facility: SURGERY | Age: 53
End: 2018-10-22

## 2018-10-22 VITALS — HEIGHT: 68 IN

## 2018-10-22 DIAGNOSIS — K92.2 GASTROINTESTINAL HEMORRHAGE, UNSPECIFIED: ICD-10-CM

## 2018-10-22 DIAGNOSIS — K62.5 HEMORRHAGE OF ANUS AND RECTUM: ICD-10-CM

## 2018-10-22 PROCEDURE — 45378 DIAGNOSTIC COLONOSCOPY: CPT | Performed by: INTERNAL MEDICINE

## 2018-12-28 ENCOUNTER — LAB (OUTPATIENT)
Dept: LAB | Facility: HOSPITAL | Age: 53
End: 2018-12-28

## 2018-12-28 ENCOUNTER — OFFICE VISIT (OUTPATIENT)
Dept: INFECTIOUS DISEASES | Facility: CLINIC | Age: 53
End: 2018-12-28

## 2018-12-28 VITALS
TEMPERATURE: 99 F | HEART RATE: 65 BPM | DIASTOLIC BLOOD PRESSURE: 69 MMHG | HEIGHT: 68 IN | RESPIRATION RATE: 12 BRPM | SYSTOLIC BLOOD PRESSURE: 105 MMHG | WEIGHT: 281.4 LBS | BODY MASS INDEX: 42.65 KG/M2

## 2018-12-28 DIAGNOSIS — B20 HIV (HUMAN IMMUNODEFICIENCY VIRUS INFECTION) (HCC): Primary | ICD-10-CM

## 2018-12-28 DIAGNOSIS — B20 HIV (HUMAN IMMUNODEFICIENCY VIRUS INFECTION) (HCC): ICD-10-CM

## 2018-12-28 LAB
ALBUMIN SERPL-MCNC: 4.1 G/DL (ref 3.5–5.2)
ALBUMIN/GLOB SERPL: 1.1 G/DL
ALP SERPL-CCNC: 62 U/L (ref 39–117)
ALT SERPL W P-5'-P-CCNC: 36 U/L (ref 1–41)
ANION GAP SERPL CALCULATED.3IONS-SCNC: 9.3 MMOL/L
AST SERPL-CCNC: 30 U/L (ref 1–40)
BILIRUB SERPL-MCNC: 0.5 MG/DL (ref 0.1–1.2)
BUN BLD-MCNC: 12 MG/DL (ref 6–20)
BUN/CREAT SERPL: 9.4 (ref 7–25)
CALCIUM SPEC-SCNC: 9.7 MG/DL (ref 8.6–10.5)
CHLORIDE SERPL-SCNC: 104 MMOL/L (ref 98–107)
CO2 SERPL-SCNC: 26.7 MMOL/L (ref 22–29)
CREAT BLD-MCNC: 1.27 MG/DL (ref 0.76–1.27)
DEPRECATED RDW RBC AUTO: 47.1 FL (ref 37–54)
ERYTHROCYTE [DISTWIDTH] IN BLOOD BY AUTOMATED COUNT: 13.7 % (ref 11.5–14.5)
GFR SERPL CREATININE-BSD FRML MDRD: 59 ML/MIN/1.73
GLOBULIN UR ELPH-MCNC: 3.8 GM/DL
GLUCOSE BLD-MCNC: 97 MG/DL (ref 65–99)
HCT VFR BLD AUTO: 45.3 % (ref 40.4–52.2)
HGB BLD-MCNC: 15.4 G/DL (ref 13.7–17.6)
MCH RBC QN AUTO: 32 PG (ref 27–32.7)
MCHC RBC AUTO-ENTMCNC: 34 G/DL (ref 32.6–36.4)
MCV RBC AUTO: 94 FL (ref 79.8–96.2)
PLATELET # BLD AUTO: 140 10*3/MM3 (ref 140–500)
PMV BLD AUTO: 9.9 FL (ref 6–12)
POTASSIUM BLD-SCNC: 4.6 MMOL/L (ref 3.5–5.2)
PROT SERPL-MCNC: 7.9 G/DL (ref 6–8.5)
RBC # BLD AUTO: 4.82 10*6/MM3 (ref 4.6–6)
SODIUM BLD-SCNC: 140 MMOL/L (ref 136–145)
WBC NRBC COR # BLD: 6.3 10*3/MM3 (ref 4.5–10.7)

## 2018-12-28 PROCEDURE — 86592 SYPHILIS TEST NON-TREP QUAL: CPT

## 2018-12-28 PROCEDURE — 99213 OFFICE O/P EST LOW 20 MIN: CPT | Performed by: INTERNAL MEDICINE

## 2018-12-28 PROCEDURE — 36415 COLL VENOUS BLD VENIPUNCTURE: CPT | Performed by: INTERNAL MEDICINE

## 2018-12-28 PROCEDURE — 87536 HIV-1 QUANT&REVRSE TRNSCRPJ: CPT | Performed by: INTERNAL MEDICINE

## 2018-12-28 PROCEDURE — 86481 TB AG RESPONSE T-CELL SUSP: CPT | Performed by: INTERNAL MEDICINE

## 2018-12-28 PROCEDURE — 86361 T CELL ABSOLUTE COUNT: CPT | Performed by: INTERNAL MEDICINE

## 2018-12-28 PROCEDURE — 85027 COMPLETE CBC AUTOMATED: CPT | Performed by: INTERNAL MEDICINE

## 2018-12-28 PROCEDURE — 80053 COMPREHEN METABOLIC PANEL: CPT | Performed by: INTERNAL MEDICINE

## 2018-12-28 NOTE — PROGRESS NOTES
"CC: f/u HIV    History of present illness:  Cornelio is a very nice 53 YOM here for f/u HIV care. He continues to tolerate well the Descovy (TAF/FTC) and Tivicay (DTG). He denies missed doses. Still getting refills through [facility] without any delays or cost troubles. No side effects of medications. NO recent sexual partners. HSV-2 outbreak treated by urgent care.    He underwent colonoscopy with Dr Vance and was told it was negative with next one to be done in 5 years.    He has changed his diet and is now exercising regularly. He has lost 20# in the past 2 months!    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  Not sexually active at this time    Family History:  Mom: breast cancer  Dad: MI    Allergies:  NKDA    Medications:    Current Outpatient Medications:   •  DESCOVY 200-25 MG tablet, TAKE 1 TABLET BY MOUTH DAILY, Disp: 30 tablet, Rfl: 5  •  oxymetazoline (AFRIN) 0.05 % nasal spray, 2 sprays into each nostril 2 (Two) Times a Day., Disp: , Rfl:   •  TIVICAY 50 MG tablet, TAKE 1 TABLET BY MOUTH DAILY, Disp: 30 tablet, Rfl: 5  •  vitamin D (ERGOCALCIFEROL) 59073 units capsule capsule, TAKE 1 CAPSULE BY MOUTH EVERY 7 DAYS FOR 8 DOSES, Disp: 12 capsule, Rfl: 0    Objective   Vital Signs   Temp:  [99 °F (37.2 °C)] 99 °F (37.2 °C)  Heart Rate:  [65] 65  Resp:  [12] 12  BP: (105)/(69) 105/69 /69   Pulse 65   Temp 99 °F (37.2 °C)   Resp 12   Ht 172.7 cm (68\")   Wt 128 kg (281 lb 6.4 oz)   BMI 42.79 kg/m²     Physical Exam:   General: awake, alert, NAD  Head: Normocephalic  Eyes: no scleral icterus  ENT: MMM, OP clear, no thrush. Good dentition. Large tonsils  Neck: Supple  Cardiovascular: NR, no LE edema  Respiratory: normal work of breathing on ambient air  GI: Abdomen is obese, " soft, non-tender, non-distended,  Musculoskeletal: no joint abnormalities, normal musculature  Skin: No rashes  Neurological: Alert and oriented x 3,  motor strength 5/5 in all four extremities  Psychiatric: Normal mood and affect     Labs:   CBC, CMP, HIV labs reviewed today  Lab Results   Component Value Date    WBC 6.4 07/27/2018    WBC 6.32 07/27/2018    HGB 14.7 07/27/2018    HGB 14.8 07/27/2018    HCT 42.2 07/27/2018    HCT 44.6 07/27/2018    MCV 87 07/27/2018    MCV 90.8 07/27/2018     07/27/2018     07/27/2018     Lab Results   Component Value Date    GLUCOSE 103 (H) 07/27/2018    BUN 12 07/27/2018    CREATININE 1.26 07/27/2018    EGFRIFNONA 60 (L) 07/27/2018    BCR 9.5 07/27/2018    K 4.1 07/27/2018    CO2 25.1 07/27/2018    CALCIUM 9.2 07/27/2018    ALBUMIN 4.40 07/27/2018    AST 75 (H) 07/27/2018     (H) 07/27/2018     Vit D 18    HIV Labs:  CD4 - 288/9% (7/2018)  VL - undetectable (7/2018)  Genotype - wild-type (12/2016)  Hep A naive (7/2017)  Hep B sAg - negative (7/2017)  Hep B sAB negative (7/2017)  Hep C Ab - negative (7/2018)  Tspot negative (12/2017)  RPR nonreactive (12/2017)    Assessment/Plan   1. HIV  -continue Descovy (TAF/FTC) and Tivicay(DTG) 50 mg PO daily through [facility]; stressed 100% compliance  -Labs today: CBC, CD4 count, CMP, HIV RNA, Tspot, RPR  -needs DEXA scan since > 50 years old and has HIV; will order next visit  -needs Prevnar-13 and PVX-23; RX written; will obtain either at [facility] retail pharmacy or [facility]    2. Obesity  -down 20# since last visit!    3. Hematochezia  -seen by Dr Vance; negative colonoscopy    4. Hepatitis secondary to fatty liver  -viral testing negative  -CMP today    RTC 6 months or sooner if needed.

## 2018-12-29 LAB
BASOPHILS # BLD AUTO: 0 X10E3/UL (ref 0–0.2)
BASOPHILS NFR BLD AUTO: 0 %
CD3+CD4+ CELLS # BLD: 235 /UL (ref 359–1519)
CD3+CD4+ CELLS NFR BLD: 9.4 % (ref 30.8–58.5)
EOSINOPHIL # BLD AUTO: 0.5 X10E3/UL (ref 0–0.4)
EOSINOPHIL # BLD AUTO: 8 %
ERYTHROCYTE [DISTWIDTH] IN BLOOD BY AUTOMATED COUNT: 14.3 % (ref 12.3–15.4)
HCT VFR BLD AUTO: 44.6 % (ref 37.5–51)
HGB BLD-MCNC: 15.2 G/DL (ref 13–17.7)
IMM GRANULOCYTES # BLD: 0 X10E3/UL (ref 0–0.1)
IMM GRANULOCYTES NFR BLD: 0 %
LYMPHOCYTES # BLD AUTO: 2.5 X10E3/UL (ref 0.7–3.1)
LYMPHOCYTES NFR BLD AUTO: 41 %
MCH RBC QN AUTO: 30.8 PG (ref 26.6–33)
MCHC RBC AUTO-ENTMCNC: 34.1 G/DL (ref 31.5–35.7)
MCV RBC AUTO: 91 FL (ref 79–97)
MONOCYTES # BLD AUTO: 0.4 X10E3/UL (ref 0.1–0.9)
MONOCYTES NFR BLD AUTO: 6 %
NEUTROPHILS # BLD AUTO: 2.7 X10E3/UL (ref 1.4–7)
NEUTROPHILS NFR BLD AUTO: 45 %
PLATELET # BLD AUTO: 156 X10E3/UL (ref 150–379)
RBC # BLD AUTO: 4.93 X10E6/UL (ref 4.14–5.8)
RPR SER QL: NORMAL
WBC # BLD AUTO: 6.1 X10E3/UL (ref 3.4–10.8)

## 2018-12-30 LAB
HIV1 RNA # SERPL NAA+PROBE: <20 COPIES/ML
LOG10 HIV-1 RNA: NORMAL LOG10COPY/ML
TSPOT INTERPRETATION: NEGATIVE
TSPOT NIL CONTROL INTERPRETATION: NORMAL
TSPOT PANEL A: 0
TSPOT PANEL B: 0
TSPOT POS CONTROL INTERPRETATION: NORMAL

## 2019-01-24 RX ORDER — DOLUTEGRAVIR SODIUM 50 MG/1
TABLET, FILM COATED ORAL
Qty: 30 TABLET | Refills: 5 | Status: SHIPPED | OUTPATIENT
Start: 2019-01-24 | End: 2019-07-02 | Stop reason: SDUPTHER

## 2019-01-24 RX ORDER — EMTRICITABINE AND TENOFOVIR ALAFENAMIDE 200; 25 MG/1; MG/1
TABLET ORAL
Qty: 30 TABLET | Refills: 5 | Status: SHIPPED | OUTPATIENT
Start: 2019-01-24 | End: 2019-07-02 | Stop reason: SDUPTHER

## 2019-07-02 ENCOUNTER — APPOINTMENT (OUTPATIENT)
Dept: LAB | Facility: HOSPITAL | Age: 54
End: 2019-07-02

## 2019-07-02 ENCOUNTER — OFFICE VISIT (OUTPATIENT)
Dept: INFECTIOUS DISEASES | Facility: CLINIC | Age: 54
End: 2019-07-02

## 2019-07-02 VITALS
SYSTOLIC BLOOD PRESSURE: 130 MMHG | HEART RATE: 71 BPM | BODY MASS INDEX: 42.5 KG/M2 | DIASTOLIC BLOOD PRESSURE: 77 MMHG | TEMPERATURE: 98.7 F | WEIGHT: 280.4 LBS | HEIGHT: 68 IN

## 2019-07-02 DIAGNOSIS — B20 HIV (HUMAN IMMUNODEFICIENCY VIRUS INFECTION) (HCC): Primary | ICD-10-CM

## 2019-07-02 LAB
ALBUMIN SERPL-MCNC: 4.4 G/DL (ref 3.5–5.2)
ALBUMIN/GLOB SERPL: 1.3 G/DL
ALP SERPL-CCNC: 52 U/L (ref 39–117)
ALT SERPL W P-5'-P-CCNC: 26 U/L (ref 1–41)
ANION GAP SERPL CALCULATED.3IONS-SCNC: 9.6 MMOL/L (ref 5–15)
AST SERPL-CCNC: 21 U/L (ref 1–40)
BASOPHILS # BLD AUTO: 0.03 10*3/MM3 (ref 0–0.2)
BASOPHILS NFR BLD AUTO: 0.6 % (ref 0–1.5)
BILIRUB SERPL-MCNC: 0.4 MG/DL (ref 0.2–1.2)
BUN BLD-MCNC: 18 MG/DL (ref 6–20)
BUN/CREAT SERPL: 17 (ref 7–25)
CALCIUM SPEC-SCNC: 10.1 MG/DL (ref 8.6–10.5)
CHLORIDE SERPL-SCNC: 104 MMOL/L (ref 98–107)
CO2 SERPL-SCNC: 26.4 MMOL/L (ref 22–29)
CREAT BLD-MCNC: 1.06 MG/DL (ref 0.76–1.27)
DEPRECATED RDW RBC AUTO: 41.8 FL (ref 37–54)
EOSINOPHIL # BLD AUTO: 0.15 10*3/MM3 (ref 0–0.4)
EOSINOPHIL NFR BLD AUTO: 2.9 % (ref 0.3–6.2)
ERYTHROCYTE [DISTWIDTH] IN BLOOD BY AUTOMATED COUNT: 12.8 % (ref 12.3–15.4)
GFR SERPL CREATININE-BSD FRML MDRD: 73 ML/MIN/1.73
GLOBULIN UR ELPH-MCNC: 3.4 GM/DL
GLUCOSE BLD-MCNC: 111 MG/DL (ref 65–99)
HBV SURFACE AB SER RIA-ACNC: REACTIVE
HCT VFR BLD AUTO: 45.3 % (ref 37.5–51)
HGB BLD-MCNC: 15.1 G/DL (ref 13–17.7)
IMM GRANULOCYTES # BLD AUTO: 0.01 10*3/MM3 (ref 0–0.05)
IMM GRANULOCYTES NFR BLD AUTO: 0.2 % (ref 0–0.5)
LYMPHOCYTES # BLD AUTO: 2.19 10*3/MM3 (ref 0.7–3.1)
LYMPHOCYTES NFR BLD AUTO: 42.1 % (ref 19.6–45.3)
MCH RBC QN AUTO: 30 PG (ref 26.6–33)
MCHC RBC AUTO-ENTMCNC: 33.3 G/DL (ref 31.5–35.7)
MCV RBC AUTO: 90.1 FL (ref 79–97)
MONOCYTES # BLD AUTO: 0.4 10*3/MM3 (ref 0.1–0.9)
MONOCYTES NFR BLD AUTO: 7.7 % (ref 5–12)
NEUTROPHILS # BLD AUTO: 2.42 10*3/MM3 (ref 1.7–7)
NEUTROPHILS NFR BLD AUTO: 46.5 % (ref 42.7–76)
NRBC BLD AUTO-RTO: 0 /100 WBC (ref 0–0.2)
PLATELET # BLD AUTO: 150 10*3/MM3 (ref 140–450)
PMV BLD AUTO: 10 FL (ref 6–12)
POTASSIUM BLD-SCNC: 4.4 MMOL/L (ref 3.5–5.2)
PROT SERPL-MCNC: 7.8 G/DL (ref 6–8.5)
RBC # BLD AUTO: 5.03 10*6/MM3 (ref 4.14–5.8)
SODIUM BLD-SCNC: 140 MMOL/L (ref 136–145)
TSH SERPL DL<=0.05 MIU/L-ACNC: 2.37 MIU/ML (ref 0.27–4.2)
WBC NRBC COR # BLD: 5.2 10*3/MM3 (ref 3.4–10.8)

## 2019-07-02 PROCEDURE — 87536 HIV-1 QUANT&REVRSE TRNSCRPJ: CPT | Performed by: INTERNAL MEDICINE

## 2019-07-02 PROCEDURE — 80053 COMPREHEN METABOLIC PANEL: CPT | Performed by: INTERNAL MEDICINE

## 2019-07-02 PROCEDURE — 85025 COMPLETE CBC W/AUTO DIFF WBC: CPT | Performed by: INTERNAL MEDICINE

## 2019-07-02 PROCEDURE — 84443 ASSAY THYROID STIM HORMONE: CPT | Performed by: INTERNAL MEDICINE

## 2019-07-02 PROCEDURE — 36415 COLL VENOUS BLD VENIPUNCTURE: CPT | Performed by: INTERNAL MEDICINE

## 2019-07-02 PROCEDURE — 86361 T CELL ABSOLUTE COUNT: CPT | Performed by: INTERNAL MEDICINE

## 2019-07-02 PROCEDURE — 86706 HEP B SURFACE ANTIBODY: CPT | Performed by: INTERNAL MEDICINE

## 2019-07-02 PROCEDURE — 84403 ASSAY OF TOTAL TESTOSTERONE: CPT | Performed by: INTERNAL MEDICINE

## 2019-07-02 PROCEDURE — 84402 ASSAY OF FREE TESTOSTERONE: CPT | Performed by: INTERNAL MEDICINE

## 2019-07-02 PROCEDURE — 99213 OFFICE O/P EST LOW 20 MIN: CPT | Performed by: INTERNAL MEDICINE

## 2019-07-02 NOTE — PROGRESS NOTES
"CC: f/u HIV    History of present illness:  Cornelio is a very nice 53 YOM here for f/u HIV care. He continues to tolerate well the Descovy (TAF/FTC) and Tivicay (DTG). He denies missed doses. Still getting refills through [facility] without any delays or cost troubles. No side effects of medications.     He had a skin carcinoma removed under the left breast since I last saw him. The area is healing well.    His weight went down but has now plateaued. He is asking about TSH and testosterone being checked.     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  Not sexually active at this time    Family History:  Mom: breast cancer  Dad: MI    Allergies:  NKDA    Medications:    Current Outpatient Medications:   •  DESCOVY 200-25 MG per tablet, TAKE 1 TABLET BY MOUTH DAILY, Disp: 30 tablet, Rfl: 5  •  oxymetazoline (AFRIN) 0.05 % nasal spray, 2 sprays into each nostril 2 (Two) Times a Day., Disp: , Rfl:   •  TIVICAY 50 MG tablet, TAKE 1 TABLET BY MOUTH DAILY, Disp: 30 tablet, Rfl: 5  •  vitamin D (ERGOCALCIFEROL) 65165 units capsule capsule, TAKE 1 CAPSULE BY MOUTH EVERY 7 DAYS FOR 8 DOSES, Disp: 12 capsule, Rfl: 0    Objective   Vital Signs   Temp:  [98.7 °F (37.1 °C)] 98.7 °F (37.1 °C)  Heart Rate:  [71] 71  BP: (130)/(77) 130/77 /77   Pulse 71   Temp 98.7 °F (37.1 °C)   Ht 172.7 cm (67.99\")   Wt 127 kg (280 lb 6.4 oz)   BMI 42.64 kg/m²     Physical Exam:   General: awake, alert, NAD  Head: Normocephalic  Eyes: no scleral icterus  ENT: MMM, OP clear, no thrush. Good dentition. Large tonsils  Neck: Supple  Cardiovascular: NR, no LE edema  Respiratory: normal work of breathing on ambient air  GI: Abdomen is obese, soft, non-tender, non-distended,  Musculoskeletal: no joint abnormalities, " normal musculature  Skin: No rashes  Neurological: Alert and oriented x 3,  motor strength 5/5 in all four extremities  Psychiatric: Normal mood and affect     Labs:   CBC, CMP, HIV labs reviewed today  Lab Results   Component Value Date    WBC 6.30 12/28/2018    WBC 6.1 12/28/2018    HGB 15.4 12/28/2018    HGB 15.2 12/28/2018    HCT 45.3 12/28/2018    HCT 44.6 12/28/2018    MCV 94.0 12/28/2018    MCV 91 12/28/2018     12/28/2018     12/28/2018     Lab Results   Component Value Date    GLUCOSE 97 12/28/2018    BUN 12 12/28/2018    CREATININE 1.27 12/28/2018    EGFRIFNONA 59 (L) 12/28/2018    BCR 9.4 12/28/2018    K 4.6 12/28/2018    CO2 26.7 12/28/2018    CALCIUM 9.7 12/28/2018    ALBUMIN 4.10 12/28/2018    AST 30 12/28/2018    ALT 36 12/28/2018     HIV Labs:  CD4 - 235/9% (12/2018)  VL - undetectable (12/2018)  Genotype - wild-type (12/2016)  Hep A naive (7/2017)  Hep B sAg - negative (7/2017)  Hep B sAB negative (7/2017)  Hep C Ab - negative (7/2018)  Tspot negative (12/2018)  RPR non-reactive (12/2018)    Assessment/Plan   1. HIV  -continue Descovy (TAF/FTC)  mg and Tivicay (DTG) 50 mg PO daily through [facility]; stressed 100% compliance  -Labs today: CBC, CD4 count, CMP, HIV RNA  -check Hep B S Ab to confirm immunity  -needs Prevnar-13 today; RX written for [facility] pharmacy; will give PVX-23 next visit    2. Obesity  -weight has stabilized  -check TSH and testosterone    3. Hematochezia  -seen by Dr Vance; negative colonoscopy; resolved    4. Hepatitis secondary to fatty liver  -viral testing negative  -CMP normal last visit with weight loss  -repeat CMP today    RTC 6 months or sooner if needed.

## 2019-07-03 LAB
BASOPHILS # BLD AUTO: 0 X10E3/UL (ref 0–0.2)
BASOPHILS NFR BLD AUTO: 0 %
CD3+CD4+ CELLS # BLD: 223 /UL (ref 359–1519)
CD3+CD4+ CELLS NFR BLD: 9.3 % (ref 30.8–58.5)
EOSINOPHIL # BLD AUTO: 0.1 X10E3/UL (ref 0–0.4)
EOSINOPHIL # BLD AUTO: 2 %
ERYTHROCYTE [DISTWIDTH] IN BLOOD BY AUTOMATED COUNT: 13.3 % (ref 12.3–15.4)
HCT VFR BLD AUTO: 43.3 % (ref 37.5–51)
HGB BLD-MCNC: 15.3 G/DL (ref 13–17.7)
IMM GRANULOCYTES # BLD: 0 X10E3/UL (ref 0–0.1)
IMM GRANULOCYTES NFR BLD: 0 %
LYMPHOCYTES # BLD AUTO: 2.4 X10E3/UL (ref 0.7–3.1)
LYMPHOCYTES NFR BLD AUTO: 46 %
MCH RBC QN AUTO: 31 PG (ref 26.6–33)
MCHC RBC AUTO-ENTMCNC: 35.3 G/DL (ref 31.5–35.7)
MCV RBC AUTO: 88 FL (ref 79–97)
MONOCYTES # BLD AUTO: 0.4 X10E3/UL (ref 0.1–0.9)
MONOCYTES NFR BLD AUTO: 8 %
NEUTROPHILS # BLD AUTO: 2.4 X10E3/UL (ref 1.4–7)
NEUTROPHILS NFR BLD AUTO: 44 %
PLATELET # BLD AUTO: 154 X10E3/UL (ref 150–450)
RBC # BLD AUTO: 4.94 X10E6/UL (ref 4.14–5.8)
WBC # BLD AUTO: 5.3 X10E3/UL (ref 3.4–10.8)

## 2019-07-04 LAB
HIV1 RNA # SERPL NAA+PROBE: <20 COPIES/ML
LOG10 HIV-1 RNA: NORMAL LOG10COPY/ML
TESTOST FREE SERPL-MCNC: 8.1 PG/ML (ref 7.2–24)
TESTOST SERPL-MCNC: 237 NG/DL (ref 264–916)

## 2020-01-14 ENCOUNTER — OFFICE VISIT (OUTPATIENT)
Dept: INFECTIOUS DISEASES | Facility: CLINIC | Age: 55
End: 2020-01-14

## 2020-01-14 ENCOUNTER — APPOINTMENT (OUTPATIENT)
Dept: LAB | Facility: HOSPITAL | Age: 55
End: 2020-01-14

## 2020-01-14 VITALS
DIASTOLIC BLOOD PRESSURE: 77 MMHG | HEART RATE: 73 BPM | BODY MASS INDEX: 47.74 KG/M2 | HEIGHT: 68 IN | TEMPERATURE: 98.2 F | SYSTOLIC BLOOD PRESSURE: 139 MMHG | WEIGHT: 315 LBS

## 2020-01-14 DIAGNOSIS — Z21 ASYMPTOMATIC HIV INFECTION (HCC): Primary | ICD-10-CM

## 2020-01-14 LAB
ALBUMIN SERPL-MCNC: 4.3 G/DL (ref 3.5–5.2)
ALBUMIN/GLOB SERPL: 1.2 G/DL
ALP SERPL-CCNC: 56 U/L (ref 39–117)
ALT SERPL W P-5'-P-CCNC: 54 U/L (ref 1–41)
ANION GAP SERPL CALCULATED.3IONS-SCNC: 11.1 MMOL/L (ref 5–15)
AST SERPL-CCNC: 35 U/L (ref 1–40)
BASOPHILS # BLD AUTO: 0.03 10*3/MM3 (ref 0–0.2)
BASOPHILS NFR BLD AUTO: 0.4 % (ref 0–1.5)
BILIRUB SERPL-MCNC: 0.4 MG/DL (ref 0.2–1.2)
BUN BLD-MCNC: 11 MG/DL (ref 6–20)
BUN/CREAT SERPL: 10.4 (ref 7–25)
CALCIUM SPEC-SCNC: 9.6 MG/DL (ref 8.6–10.5)
CHLORIDE SERPL-SCNC: 99 MMOL/L (ref 98–107)
CO2 SERPL-SCNC: 23.9 MMOL/L (ref 22–29)
CREAT BLD-MCNC: 1.06 MG/DL (ref 0.76–1.27)
DEPRECATED RDW RBC AUTO: 42.9 FL (ref 37–54)
EOSINOPHIL # BLD AUTO: 0.15 10*3/MM3 (ref 0–0.4)
EOSINOPHIL NFR BLD AUTO: 2.1 % (ref 0.3–6.2)
ERYTHROCYTE [DISTWIDTH] IN BLOOD BY AUTOMATED COUNT: 13.2 % (ref 12.3–15.4)
GFR SERPL CREATININE-BSD FRML MDRD: 73 ML/MIN/1.73
GLOBULIN UR ELPH-MCNC: 3.7 GM/DL
GLUCOSE BLD-MCNC: 103 MG/DL (ref 65–99)
HCT VFR BLD AUTO: 45.4 % (ref 37.5–51)
HCV AB SER DONR QL: NORMAL
HGB BLD-MCNC: 15.5 G/DL (ref 13–17.7)
IMM GRANULOCYTES # BLD AUTO: 0.02 10*3/MM3 (ref 0–0.05)
IMM GRANULOCYTES NFR BLD AUTO: 0.3 % (ref 0–0.5)
LYMPHOCYTES # BLD AUTO: 2.85 10*3/MM3 (ref 0.7–3.1)
LYMPHOCYTES NFR BLD AUTO: 39.4 % (ref 19.6–45.3)
MCH RBC QN AUTO: 30.3 PG (ref 26.6–33)
MCHC RBC AUTO-ENTMCNC: 34.1 G/DL (ref 31.5–35.7)
MCV RBC AUTO: 88.7 FL (ref 79–97)
MONOCYTES # BLD AUTO: 0.51 10*3/MM3 (ref 0.1–0.9)
MONOCYTES NFR BLD AUTO: 7.1 % (ref 5–12)
NEUTROPHILS # BLD AUTO: 3.67 10*3/MM3 (ref 1.7–7)
NEUTROPHILS NFR BLD AUTO: 50.7 % (ref 42.7–76)
NRBC BLD AUTO-RTO: 0 /100 WBC (ref 0–0.2)
PLATELET # BLD AUTO: 179 10*3/MM3 (ref 140–450)
PMV BLD AUTO: 9.9 FL (ref 6–12)
POTASSIUM BLD-SCNC: 4 MMOL/L (ref 3.5–5.2)
PROT SERPL-MCNC: 8 G/DL (ref 6–8.5)
RBC # BLD AUTO: 5.12 10*6/MM3 (ref 4.14–5.8)
SODIUM BLD-SCNC: 134 MMOL/L (ref 136–145)
WBC NRBC COR # BLD: 7.23 10*3/MM3 (ref 3.4–10.8)

## 2020-01-14 PROCEDURE — 86803 HEPATITIS C AB TEST: CPT | Performed by: INTERNAL MEDICINE

## 2020-01-14 PROCEDURE — 85025 COMPLETE CBC W/AUTO DIFF WBC: CPT | Performed by: INTERNAL MEDICINE

## 2020-01-14 PROCEDURE — 99213 OFFICE O/P EST LOW 20 MIN: CPT | Performed by: INTERNAL MEDICINE

## 2020-01-14 PROCEDURE — 80053 COMPREHEN METABOLIC PANEL: CPT | Performed by: INTERNAL MEDICINE

## 2020-01-14 PROCEDURE — 86592 SYPHILIS TEST NON-TREP QUAL: CPT | Performed by: INTERNAL MEDICINE

## 2020-01-14 PROCEDURE — 87536 HIV-1 QUANT&REVRSE TRNSCRPJ: CPT | Performed by: INTERNAL MEDICINE

## 2020-01-14 PROCEDURE — 36415 COLL VENOUS BLD VENIPUNCTURE: CPT | Performed by: INTERNAL MEDICINE

## 2020-01-14 PROCEDURE — 86481 TB AG RESPONSE T-CELL SUSP: CPT | Performed by: INTERNAL MEDICINE

## 2020-01-14 PROCEDURE — 86361 T CELL ABSOLUTE COUNT: CPT | Performed by: INTERNAL MEDICINE

## 2020-01-14 RX ORDER — EMTRICITABINE AND TENOFOVIR ALAFENAMIDE 200; 25 MG/1; MG/1
TABLET ORAL
COMMUNITY
Start: 2019-11-16 | End: 2020-06-11 | Stop reason: SDUPTHER

## 2020-01-14 RX ORDER — DOLUTEGRAVIR SODIUM 50 MG/1
TABLET, FILM COATED ORAL
COMMUNITY
Start: 2019-11-16 | End: 2020-06-11

## 2020-01-14 NOTE — PROGRESS NOTES
"CC: f/u HIV    History of present illness:  Cornelio is a very nice 54 YOM here for f/u HIV care. He continues to tolerate well the Descovy (TAF/FTC) and Tivicay (DTG). He denies missed doses. Still getting refills through [facility] without any delays or cost troubles. No side effects of medications.     He's gained about 35# since hie last visit. This is back to where he was a few years ago. He had previously loss weight. He says he's stopped exercising and is not eating particularly healthy. One barrier to exercise is skin tags causing chaffing. He is willing to see a dermatologist about removal.     He has some SOA with exertion he attributes to the weight gain. No chest pressure.       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  Not sexually active at this time    Family History:  Mom: breast cancer  Dad: MI    Allergies:  NKDA    Medications:    Current Outpatient Medications:   •  DESCOVY 200-25 MG per tablet, , Disp: , Rfl:   •  oxymetazoline (AFRIN) 0.05 % nasal spray, 2 sprays into each nostril 2 (Two) Times a Day., Disp: , Rfl:   •  TIVICAY 50 MG tablet, , Disp: , Rfl:   •  vitamin D (ERGOCALCIFEROL) 81940 units capsule capsule, TAKE 1 CAPSULE BY MOUTH EVERY 7 DAYS FOR 8 DOSES, Disp: 12 capsule, Rfl: 0    Objective   Vital Signs   /77   Pulse 73   Temp 98.2 °F (36.8 °C)   Ht 172.7 cm (67.99\")   Wt (!) 143 kg (315 lb 6.4 oz)   BMI 47.97 kg/m²     Physical Exam:   General: awake, alert, NAD  Eyes: no scleral icterus  ENT: MMM, OP clear, no thrush. Good dentition  Cardiovascular: NR, no LE edema  Respiratory: normal work of breathing on ambient air  GI: Abdomen is obese,non-distended,  Musculoskeletal: normal musculature  Skin: No rashes  Neurological: " Alert and oriented x 3  Psychiatric: Normal mood and affect     Labs:   CBC, CMP, HIV labs reviewed today  Lab Results   Component Value Date    WBC 5.3 07/02/2019    WBC 5.20 07/02/2019    HGB 15.3 07/02/2019    HGB 15.1 07/02/2019    HCT 43.3 07/02/2019    HCT 45.3 07/02/2019    MCV 88 07/02/2019    MCV 90.1 07/02/2019     07/02/2019     07/02/2019     Lab Results   Component Value Date    GLUCOSE 111 (H) 07/02/2019    BUN 18 07/02/2019    CREATININE 1.06 07/02/2019    EGFRIFNONA 73 07/02/2019    BCR 17.0 07/02/2019    K 4.4 07/02/2019    CO2 26.4 07/02/2019    CALCIUM 10.1 07/02/2019    ALBUMIN 4.40 07/02/2019    AST 21 07/02/2019    ALT 26 07/02/2019     No results found for: HGBA1C  No results found for: CHOL, CHLPL, TRIG, HDL, LDL, LDLDIRECT    HIV Labs:  CD4 - 223/9% (7/2019)  VL - undetectable (7/2019)  Genotype - wild-type (12/2016)  Hep A naive (7/2017)  Hep B sAg - negative (7/2017)  Hep B sAb positive after vaccination (7/2019)  Hep C Ab - negative (7/2018)  Tspot negative (12/2018)  RPR non-reactive (12/2018)    Assessment/Plan   1. HIV  -continue Descovy (TAF/FTC)  mg and Tivicay (DTG) 50 mg PO daily [facility]; stressed 100% compliance  -Labs today: CBC, CD4 count, CMP, HIV RNA, RPR, Hep C Ab, Tspot  -needs Prevnar-13 today and PVX-23 in one year;  He did not get it after last visit; RX written for the former    2. Morbid obesity  -weight increased 35# since last visit due to stopping exercise and dietary changes    3. Fatty liver disease  -CMP today; LFTs were normal last visit    Overall his HIV is well-controlled as he is very compliant with his HIV medications. I stressed to him that he really needs to establish with a PCP who can help manage his weight, monitor his cholesterol, and provide age-appropriate screening recommendations. I also gave him the name of a male dermatologist who he could see about having the skin tags removed which would increase his ability to  exercise.     RTC 6 months or sooner if needed.

## 2020-01-15 LAB
BASOPHILS # BLD AUTO: 0 X10E3/UL (ref 0–0.2)
BASOPHILS NFR BLD AUTO: 0 %
CD3+CD4+ CELLS # BLD: 260 /UL (ref 359–1519)
CD3+CD4+ CELLS NFR BLD: 9.3 % (ref 30.8–58.5)
EOSINOPHIL # BLD AUTO: 0.1 X10E3/UL (ref 0–0.4)
EOSINOPHIL # BLD AUTO: 2 %
ERYTHROCYTE [DISTWIDTH] IN BLOOD BY AUTOMATED COUNT: 13.2 % (ref 11.6–15.4)
HCT VFR BLD AUTO: 44 % (ref 37.5–51)
HGB BLD-MCNC: 15.9 G/DL (ref 13–17.7)
IMM GRANULOCYTES # BLD: 0 X10E3/UL (ref 0–0.1)
IMM GRANULOCYTES NFR BLD: 0 %
LYMPHOCYTES # BLD AUTO: 2.8 X10E3/UL (ref 0.7–3.1)
LYMPHOCYTES NFR BLD AUTO: 41 %
MCH RBC QN AUTO: 31.1 PG (ref 26.6–33)
MCHC RBC AUTO-ENTMCNC: 36.1 G/DL (ref 31.5–35.7)
MCV RBC AUTO: 86 FL (ref 79–97)
MONOCYTES # BLD AUTO: 0.5 X10E3/UL (ref 0.1–0.9)
MONOCYTES NFR BLD AUTO: 7 %
NEUTROPHILS # BLD AUTO: 3.4 X10E3/UL (ref 1.4–7)
NEUTROPHILS NFR BLD AUTO: 50 %
PLATELET # BLD AUTO: 192 X10E3/UL (ref 150–450)
RBC # BLD AUTO: 5.12 X10E6/UL (ref 4.14–5.8)
RPR SER QL: NORMAL
WBC # BLD AUTO: 6.8 X10E3/UL (ref 3.4–10.8)

## 2020-01-16 LAB
HIV1 RNA # SERPL NAA+PROBE: 90 COPIES/ML
LOG10 HIV-1 RNA: 1.95 LOG10COPY/ML
TSPOT INTERPRETATION: NEGATIVE
TSPOT NIL CONTROL INTERPRETATION: NORMAL
TSPOT PANEL A: 0
TSPOT PANEL B: 0
TSPOT POS CONTROL INTERPRETATION: NORMAL

## 2020-01-17 DIAGNOSIS — Z21 ASYMPTOMATIC HIV INFECTION (HCC): Primary | ICD-10-CM

## 2020-01-27 ENCOUNTER — RESULTS ENCOUNTER (OUTPATIENT)
Dept: INFECTIOUS DISEASES | Facility: CLINIC | Age: 55
End: 2020-01-27

## 2020-01-27 DIAGNOSIS — Z21 ASYMPTOMATIC HIV INFECTION (HCC): ICD-10-CM

## 2020-01-30 ENCOUNTER — TELEPHONE (OUTPATIENT)
Dept: INFECTIOUS DISEASES | Facility: CLINIC | Age: 55
End: 2020-01-30

## 2020-01-30 DIAGNOSIS — R06.09 DYSPNEA ON EXERTION: Primary | ICD-10-CM

## 2020-01-30 DIAGNOSIS — E66.01 MORBID OBESITY (HCC): ICD-10-CM

## 2020-01-30 DIAGNOSIS — Z21 ASYMPTOMATIC HIV INFECTION (HCC): ICD-10-CM

## 2020-01-30 NOTE — TELEPHONE ENCOUNTER
----- Message from Andrea Holm MD sent at 1/30/2020 10:22 AM EST -----  Regarding: Return Call  Please let patient know I have placed a cardiology referral to Dr Sims. However, please remind him about the need to find a primary care doctor who can make these referrals going forward. Thanks.     ----- Message -----  From: Kimberley Anderson  Sent: 1/29/2020   2:16 PM EST  To: Andrea Holm MD    Pt called,    Stated you had said you were going to send him to a Cardiologist.  He was looking online and found Dr. Hari Sims, Cardiologist at .    Do you know anything about him, and if so, would you recommend him?  Please advise pt.    Pt's #[phone]

## 2020-01-30 NOTE — TELEPHONE ENCOUNTER
Left message on voicemail that referral has been placed for cardiology and needs to get established with a PCP. Advised patient to call back if he has any questions. Jayla Deutsch RN

## 2020-02-05 ENCOUNTER — LAB (OUTPATIENT)
Dept: LAB | Facility: HOSPITAL | Age: 55
End: 2020-02-05

## 2020-02-05 ENCOUNTER — OFFICE VISIT (OUTPATIENT)
Dept: CARDIOLOGY | Facility: CLINIC | Age: 55
End: 2020-02-05

## 2020-02-05 VITALS
OXYGEN SATURATION: 95 % | HEIGHT: 68 IN | RESPIRATION RATE: 16 BRPM | BODY MASS INDEX: 47.29 KG/M2 | WEIGHT: 312 LBS | HEART RATE: 77 BPM

## 2020-02-05 DIAGNOSIS — R06.09 DYSPNEA ON EXERTION: ICD-10-CM

## 2020-02-05 DIAGNOSIS — R00.0 RAPID HEART RATE: ICD-10-CM

## 2020-02-05 DIAGNOSIS — R06.09 DYSPNEA ON EXERTION: Primary | ICD-10-CM

## 2020-02-05 LAB
ANION GAP SERPL CALCULATED.3IONS-SCNC: 11.4 MMOL/L (ref 5–15)
BUN BLD-MCNC: 13 MG/DL (ref 6–20)
BUN/CREAT SERPL: 11.6 (ref 7–25)
CALCIUM SPEC-SCNC: 9.7 MG/DL (ref 8.6–10.5)
CHLORIDE SERPL-SCNC: 101 MMOL/L (ref 98–107)
CO2 SERPL-SCNC: 26.6 MMOL/L (ref 22–29)
CREAT BLD-MCNC: 1.12 MG/DL (ref 0.76–1.27)
GFR SERPL CREATININE-BSD FRML MDRD: 68 ML/MIN/1.73
GLUCOSE BLD-MCNC: 114 MG/DL (ref 65–99)
NT-PROBNP SERPL-MCNC: <5 PG/ML (ref 5–900)
POTASSIUM BLD-SCNC: 4.7 MMOL/L (ref 3.5–5.2)
SODIUM BLD-SCNC: 139 MMOL/L (ref 136–145)

## 2020-02-05 PROCEDURE — 80048 BASIC METABOLIC PNL TOTAL CA: CPT

## 2020-02-05 PROCEDURE — 93000 ELECTROCARDIOGRAM COMPLETE: CPT | Performed by: INTERNAL MEDICINE

## 2020-02-05 PROCEDURE — 99204 OFFICE O/P NEW MOD 45 MIN: CPT | Performed by: INTERNAL MEDICINE

## 2020-02-05 PROCEDURE — 83880 ASSAY OF NATRIURETIC PEPTIDE: CPT

## 2020-02-05 PROCEDURE — 36415 COLL VENOUS BLD VENIPUNCTURE: CPT

## 2020-02-05 NOTE — PROGRESS NOTES
Please let patient know lab results are normal.  He should expect a call soon to schedule other testing as discussed in clinic visit.

## 2020-02-05 NOTE — PROGRESS NOTES
[facility]      Patient Name: Cornelio Viera  :1965  Age: 54 y.o.  Encounter Provider:  Hari Sims Jr, MD      Chief Complaint:   Chief Complaint   Patient presents with   • Shortness of Breath   • Rapid Heart Rate         HPI  Cornelio Viera is a 54 y.o. male past medical history of MIAN on CPAP and morbid obesity who presents for evaluation of dyspnea on exertion and palpitations.  Last year the patient had lost 40 pounds and was feeling well.  He was walking over 7000 steps daily but hurt his ankle and has not been walking much gaining all of that weight back.  He still walks 1 to 2 miles 2-3 times weekly.  He notes significant palpitations at rest with heart rates estimated to be between 100-120 bpm.  He feels that with exertion this worsens and limits his physical activity.  He denies dizziness or syncope.  He also notes dyspnea and with certain physical activities such as bending over to tie his shoes.  However he does not note inordinate amount of respiratory difficulty with his walking.  He denies chest pain.  No orthopnea or PND.  He notes chronic edema.  He uses his CPAP nightly.  He denies tobacco and drinks socially.  No family history of premature coronary artery disease or sudden cardiac death.      The following portions of the patient's history were reviewed and updated as appropriate: allergies, current medications, past family history, past medical history, past social history, past surgical history and problem list.      Review of Systems   Constitution: Positive for malaise/fatigue and weight gain. Negative for chills and fever.   HENT: Negative for hoarse voice and sore throat.    Eyes: Negative for double vision and photophobia.   Cardiovascular: Positive for leg swelling and palpitations. Negative for chest pain, near-syncope, orthopnea, paroxysmal nocturnal dyspnea and syncope.   Respiratory: Positive for cough and shortness of breath. Negative for wheezing.   "  Skin: Negative for poor wound healing and rash.   Musculoskeletal: Positive for joint pain. Negative for arthritis and joint swelling.   Gastrointestinal: Negative for bloating, abdominal pain, hematemesis and hematochezia.   Genitourinary: Positive for decreased libido.   Neurological: Negative for dizziness and focal weakness.   Psychiatric/Behavioral: Negative for depression and suicidal ideas.   All other systems reviewed and are negative.      OBJECTIVE:   Vital Signs  Vitals:    02/05/20 0906   Pulse: 77   Resp: 16   SpO2: 95%     Estimated body mass index is 47.44 kg/m² as calculated from the following:    Height as of this encounter: 172.7 cm (68\").    Weight as of this encounter: 142 kg (312 lb).    Physical Exam   Constitutional: He is oriented to person, place, and time. He appears well-developed and well-nourished.   HENT:   Head: Normocephalic and atraumatic.   Eyes: Pupils are equal, round, and reactive to light. Conjunctivae are normal.   Neck: No JVD present. No thyromegaly present.   Cardiovascular: Exam reveals no gallop and no friction rub.   No murmur heard.  Pulmonary/Chest: No respiratory distress. He exhibits no tenderness.   Abdominal: Bowel sounds are normal. He exhibits no distension.   Musculoskeletal: He exhibits edema. He exhibits no tenderness.   Neurological: He is alert and oriented to person, place, and time.   Skin: No rash noted. No erythema.   Psychiatric: He has a normal mood and affect. Judgment normal.         ECG 12 Lead  Date/Time: 2/5/2020 5:25 PM  Performed by: Hari Sims Jr., MD  Authorized by: Hari Sims Jr., MD   Comparison: not compared with previous ECG   Previous ECG: no previous ECG available  Rhythm: sinus rhythm    Clinical impression: normal ECG                  ASSESSMENT:      Diagnosis Plan   1. Dyspnea on exertion     2. Rapid heart rate           PLAN OF CARE:     1. Palpitations -sporadic and transient.  Check Holter monitor.  2. Dyspnea on " exertion -also noted is lower extremity edema on exam.  This has no reliable relationship to exertion as he does not experience the symptoms with walking.  Check a BMP and BNP today.  Check transthoracic echocardiogram.  3. MIAN -compliant with CPAP therapy.  Continue same.    Return to clinic 3 months             Discharge Medications           Accurate as of February 5, 2020  9:10 AM. If you have any questions, ask your nurse or doctor.               Continue These Medications      Instructions Start Date   DESCOVY 200-25 MG per tablet  Generic drug:  Emtricitabine-Tenofovir AF   No dose, route, or frequency recorded.      oxymetazoline 0.05 % nasal spray  Commonly known as:  AFRIN   2 sprays, Nasal, 2 Times Daily      TIVICAY 50 MG tablet  Generic drug:  dolutegravir   No dose, route, or frequency recorded.      vitamin D 1.25 MG (36174 UT) capsule capsule  Commonly known as:  ERGOCALCIFEROL   TAKE 1 CAPSULE BY MOUTH EVERY 7 DAYS FOR 8 DOSES             Thank you for allowing me to participate in the care of your patient,      Sincerely,   Hari Sims MD  [facility]  02/05/20  9:10 AM

## 2020-02-06 ENCOUNTER — TELEPHONE (OUTPATIENT)
Dept: CARDIOLOGY | Facility: CLINIC | Age: 55
End: 2020-02-06

## 2020-02-06 NOTE — TELEPHONE ENCOUNTER
Notified patient of results and recommendations. He verbalized understanding.    [place], RN  Triage [facility]

## 2020-02-06 NOTE — TELEPHONE ENCOUNTER
----- Message from Hari Sims Jr., MD sent at 2/5/2020  5:28 PM EST -----  Please let patient know lab results are normal.  He should expect a call soon to schedule other testing as discussed in clinic visit.

## 2020-02-07 ENCOUNTER — HOSPITAL ENCOUNTER (OUTPATIENT)
Dept: CARDIOLOGY | Facility: HOSPITAL | Age: 55
Discharge: HOME | End: 2020-02-07
Admitting: INTERNAL MEDICINE

## 2020-02-07 VITALS
SYSTOLIC BLOOD PRESSURE: 126 MMHG | OXYGEN SATURATION: 96 % | WEIGHT: 312 LBS | DIASTOLIC BLOOD PRESSURE: 61 MMHG | BODY MASS INDEX: 47.29 KG/M2 | HEART RATE: 84 BPM | HEIGHT: 68 IN

## 2020-02-07 DIAGNOSIS — R06.09 DYSPNEA ON EXERTION: ICD-10-CM

## 2020-02-07 LAB
AORTIC ARCH: 2.7 CM
AORTIC ROOT ANNULUS: 2.1 CM
ASCENDING AORTA: 3.4 CM
BH CV ECHO MEAS - ACS: 2 CM
BH CV ECHO MEAS - AO MAX PG (FULL): 2.3 MMHG
BH CV ECHO MEAS - AO MAX PG: 7.7 MMHG
BH CV ECHO MEAS - AO MEAN PG (FULL): 1 MMHG
BH CV ECHO MEAS - AO MEAN PG: 4 MMHG
BH CV ECHO MEAS - AO V2 MAX: 139 CM/SEC
BH CV ECHO MEAS - AO V2 MEAN: 94.5 CM/SEC
BH CV ECHO MEAS - AO V2 VTI: 27.2 CM
BH CV ECHO MEAS - ASC AORTA: 3.4 CM
BH CV ECHO MEAS - AVA(I,A): 2.7 CM^2
BH CV ECHO MEAS - AVA(I,D): 2.7 CM^2
BH CV ECHO MEAS - AVA(V,A): 2.6 CM^2
BH CV ECHO MEAS - AVA(V,D): 2.6 CM^2
BH CV ECHO MEAS - BSA(HAYCOCK): 2.7 M^2
BH CV ECHO MEAS - BSA: 2.5 M^2
BH CV ECHO MEAS - BZI_BMI: 47.4 KILOGRAMS/M^2
BH CV ECHO MEAS - BZI_METRIC_HEIGHT: 172.7 CM
BH CV ECHO MEAS - BZI_METRIC_WEIGHT: 141.5 KG
BH CV ECHO MEAS - EDV(MOD-SP2): 128 ML
BH CV ECHO MEAS - EDV(MOD-SP4): 114 ML
BH CV ECHO MEAS - EDV(TEICH): 141.3 ML
BH CV ECHO MEAS - EF(CUBED): 77.2 %
BH CV ECHO MEAS - EF(MOD-BP): 63 %
BH CV ECHO MEAS - EF(MOD-SP2): 60.9 %
BH CV ECHO MEAS - EF(MOD-SP4): 63.2 %
BH CV ECHO MEAS - EF(TEICH): 68.8 %
BH CV ECHO MEAS - ESV(MOD-SP2): 50 ML
BH CV ECHO MEAS - ESV(MOD-SP4): 42 ML
BH CV ECHO MEAS - ESV(TEICH): 44.1 ML
BH CV ECHO MEAS - FS: 38.9 %
BH CV ECHO MEAS - IVS/LVPW: 0.92
BH CV ECHO MEAS - IVSD: 1.1 CM
BH CV ECHO MEAS - LAT PEAK E' VEL: 9 CM/SEC
BH CV ECHO MEAS - LV DIASTOLIC VOL/BSA (35-75): 46.2 ML/M^2
BH CV ECHO MEAS - LV MASS(C)D: 249.4 GRAMS
BH CV ECHO MEAS - LV MASS(C)DI: 101 GRAMS/M^2
BH CV ECHO MEAS - LV MAX PG: 5.5 MMHG
BH CV ECHO MEAS - LV MEAN PG: 3 MMHG
BH CV ECHO MEAS - LV SYSTOLIC VOL/BSA (12-30): 17 ML/M^2
BH CV ECHO MEAS - LV V1 MAX: 117 CM/SEC
BH CV ECHO MEAS - LV V1 MEAN: 86.4 CM/SEC
BH CV ECHO MEAS - LV V1 VTI: 23.3 CM
BH CV ECHO MEAS - LVIDD: 5.4 CM
BH CV ECHO MEAS - LVIDS: 3.3 CM
BH CV ECHO MEAS - LVLD AP2: 8.9 CM
BH CV ECHO MEAS - LVLD AP4: 8.7 CM
BH CV ECHO MEAS - LVLS AP2: 7.3 CM
BH CV ECHO MEAS - LVLS AP4: 7 CM
BH CV ECHO MEAS - LVOT AREA (M): 3.1 CM^2
BH CV ECHO MEAS - LVOT AREA: 3.1 CM^2
BH CV ECHO MEAS - LVOT DIAM: 2 CM
BH CV ECHO MEAS - LVPWD: 1.2 CM
BH CV ECHO MEAS - MED PEAK E' VEL: 10 CM/SEC
BH CV ECHO MEAS - MR MAX PG: 17.5 MMHG
BH CV ECHO MEAS - MR MAX VEL: 209 CM/SEC
BH CV ECHO MEAS - MV A DUR: 0.16 SEC
BH CV ECHO MEAS - MV A MAX VEL: 82.3 CM/SEC
BH CV ECHO MEAS - MV DEC SLOPE: 330 CM/SEC^2
BH CV ECHO MEAS - MV DEC TIME: 0.25 SEC
BH CV ECHO MEAS - MV E MAX VEL: 81.4 CM/SEC
BH CV ECHO MEAS - MV E/A: 0.99
BH CV ECHO MEAS - MV MAX PG: 3.7 MMHG
BH CV ECHO MEAS - MV MEAN PG: 2 MMHG
BH CV ECHO MEAS - MV P1/2T MAX VEL: 82.3 CM/SEC
BH CV ECHO MEAS - MV P1/2T: 73 MSEC
BH CV ECHO MEAS - MV V2 MAX: 96.1 CM/SEC
BH CV ECHO MEAS - MV V2 MEAN: 58.8 CM/SEC
BH CV ECHO MEAS - MV V2 VTI: 31.4 CM
BH CV ECHO MEAS - MVA P1/2T LCG: 2.7 CM^2
BH CV ECHO MEAS - MVA(P1/2T): 3 CM^2
BH CV ECHO MEAS - MVA(VTI): 2.3 CM^2
BH CV ECHO MEAS - PA ACC TIME: 0.1 SEC
BH CV ECHO MEAS - PA MAX PG (FULL): 1.8 MMHG
BH CV ECHO MEAS - PA MAX PG: 3.6 MMHG
BH CV ECHO MEAS - PA PR(ACCEL): 32.7 MMHG
BH CV ECHO MEAS - PA V2 MAX: 95 CM/SEC
BH CV ECHO MEAS - PULM A REVS DUR: 0.08 SEC
BH CV ECHO MEAS - PULM A REVS VEL: 20.9 CM/SEC
BH CV ECHO MEAS - PULM DIAS VEL: 39.7 CM/SEC
BH CV ECHO MEAS - PULM S/D: 1.1
BH CV ECHO MEAS - PULM SYS VEL: 45.3 CM/SEC
BH CV ECHO MEAS - PVA(V,A): 2.7 CM^2
BH CV ECHO MEAS - PVA(V,D): 2.7 CM^2
BH CV ECHO MEAS - QP/QS: 0.7
BH CV ECHO MEAS - RAP SYSTOLE: 3 MMHG
BH CV ECHO MEAS - RV MAX PG: 1.8 MMHG
BH CV ECHO MEAS - RV MEAN PG: 1 MMHG
BH CV ECHO MEAS - RV V1 MAX: 66.6 CM/SEC
BH CV ECHO MEAS - RV V1 MEAN: 48.8 CM/SEC
BH CV ECHO MEAS - RV V1 VTI: 13.5 CM
BH CV ECHO MEAS - RVOT AREA: 3.8 CM^2
BH CV ECHO MEAS - RVOT DIAM: 2.2 CM
BH CV ECHO MEAS - RVSP: 20.5 MMHG
BH CV ECHO MEAS - SI(CUBED): 49.2 ML/M^2
BH CV ECHO MEAS - SI(LVOT): 29.6 ML/M^2
BH CV ECHO MEAS - SI(MOD-SP2): 31.6 ML/M^2
BH CV ECHO MEAS - SI(MOD-SP4): 29.2 ML/M^2
BH CV ECHO MEAS - SI(TEICH): 39.4 ML/M^2
BH CV ECHO MEAS - SUP REN AO DIAM: 2 CM
BH CV ECHO MEAS - SV(CUBED): 121.5 ML
BH CV ECHO MEAS - SV(LVOT): 73.2 ML
BH CV ECHO MEAS - SV(MOD-SP2): 78 ML
BH CV ECHO MEAS - SV(MOD-SP4): 72 ML
BH CV ECHO MEAS - SV(RVOT): 51.3 ML
BH CV ECHO MEAS - SV(TEICH): 97.2 ML
BH CV ECHO MEAS - TAPSE (>1.6): 1.8 CM2
BH CV ECHO MEAS - TR MAX VEL: 209 CM/SEC
BH CV ECHO MEASUREMENTS AVERAGE E/E' RATIO: 8.57
BH CV XLRA - RV BASE: 3 CM
BH CV XLRA - RV LENGTH: 7.8 CM
BH CV XLRA - RV MID: 2.5 CM
BH CV XLRA - TDI S': 14 CM/SEC
LEFT ATRIUM VOLUME INDEX: 18 ML/M2
MAXIMAL PREDICTED HEART RATE: 165 BPM
SINUS: 3.1 CM
STJ: 3.1 CM
STRESS TARGET HR: 140 BPM

## 2020-02-07 PROCEDURE — 25010000002 PERFLUTREN (DEFINITY) 8.476 MG IN SODIUM CHLORIDE 0.9 % 10 ML INJECTION: Performed by: INTERNAL MEDICINE

## 2020-02-07 PROCEDURE — 93306 TTE W/DOPPLER COMPLETE: CPT | Performed by: INTERNAL MEDICINE

## 2020-02-07 PROCEDURE — 93306 TTE W/DOPPLER COMPLETE: CPT

## 2020-02-07 RX ADMIN — PERFLUTREN 1.5 ML: 6.52 INJECTION, SUSPENSION INTRAVENOUS at 09:14

## 2020-02-10 ENCOUNTER — TELEPHONE (OUTPATIENT)
Dept: CARDIOLOGY | Facility: CLINIC | Age: 55
End: 2020-02-10

## 2020-02-10 NOTE — TELEPHONE ENCOUNTER
----- Message from Hari Sims Jr., MD sent at 2/10/2020 11:04 AM EST -----  Please let the patient know this is a normal echocardiogram

## 2020-02-10 NOTE — TELEPHONE ENCOUNTER
Pt came in to bring heart monitor back I let him know that his echo was normal    Pt understood  Thanks  SW

## 2020-02-13 ENCOUNTER — TELEPHONE (OUTPATIENT)
Dept: CARDIOLOGY | Facility: CLINIC | Age: 55
End: 2020-02-13

## 2020-02-13 NOTE — TELEPHONE ENCOUNTER
----- Message from Hari Sims Jr., MD sent at 2/12/2020  4:41 PM EST -----  Please tell patient this was a normal Holter study

## 2020-02-17 DIAGNOSIS — Z21 ASYMPTOMATIC HIV INFECTION (HCC): Primary | ICD-10-CM

## 2020-02-22 ENCOUNTER — RESULTS ENCOUNTER (OUTPATIENT)
Dept: INFECTIOUS DISEASES | Facility: CLINIC | Age: 55
End: 2020-02-22

## 2020-02-22 DIAGNOSIS — Z21 ASYMPTOMATIC HIV INFECTION (HCC): ICD-10-CM

## 2020-03-02 ENCOUNTER — APPOINTMENT (OUTPATIENT)
Dept: LAB | Facility: HOSPITAL | Age: 55
End: 2020-03-02

## 2020-03-02 PROCEDURE — 36415 COLL VENOUS BLD VENIPUNCTURE: CPT | Performed by: INTERNAL MEDICINE

## 2020-03-02 PROCEDURE — 87536 HIV-1 QUANT&REVRSE TRNSCRPJ: CPT | Performed by: INTERNAL MEDICINE

## 2020-03-03 LAB
HIV1 RNA # SERPL NAA+PROBE: <20 COPIES/ML
LOG10 HIV-1 RNA: NORMAL

## 2020-05-06 ENCOUNTER — TELEPHONE (OUTPATIENT)
Dept: CARDIOLOGY | Facility: CLINIC | Age: 55
End: 2020-05-06

## 2020-05-06 NOTE — TELEPHONE ENCOUNTER
LVM offering telehealth visit. Prefer video over phone. May come to clinic if prefers. Await call back

## 2020-06-11 RX ORDER — EMTRICITABINE AND TENOFOVIR ALAFENAMIDE 200; 25 MG/1; MG/1
1 TABLET ORAL DAILY
Qty: 30 TABLET | Refills: 11 | Status: SHIPPED | OUTPATIENT
Start: 2020-06-11 | End: 2020-07-11

## 2020-07-20 ENCOUNTER — OFFICE VISIT (OUTPATIENT)
Dept: INFECTIOUS DISEASES | Facility: CLINIC | Age: 55
End: 2020-07-20

## 2020-07-20 ENCOUNTER — LAB (OUTPATIENT)
Dept: LAB | Facility: HOSPITAL | Age: 55
End: 2020-07-20

## 2020-07-20 VITALS
WEIGHT: 314.6 LBS | TEMPERATURE: 98.3 F | DIASTOLIC BLOOD PRESSURE: 80 MMHG | SYSTOLIC BLOOD PRESSURE: 133 MMHG | BODY MASS INDEX: 47.68 KG/M2 | HEART RATE: 73 BPM | HEIGHT: 68 IN

## 2020-07-20 DIAGNOSIS — E66.01 MORBID OBESITY (HCC): ICD-10-CM

## 2020-07-20 DIAGNOSIS — Z21 ASYMPTOMATIC HIV INFECTION (HCC): Primary | ICD-10-CM

## 2020-07-20 LAB
ALBUMIN SERPL-MCNC: 4.2 G/DL (ref 3.5–5.2)
ALBUMIN/GLOB SERPL: 1.4 G/DL
ALP SERPL-CCNC: 51 U/L (ref 39–117)
ALT SERPL W P-5'-P-CCNC: 58 U/L (ref 1–41)
ANION GAP SERPL CALCULATED.3IONS-SCNC: 9.9 MMOL/L (ref 5–15)
AST SERPL-CCNC: 38 U/L (ref 1–40)
BASOPHILS # BLD AUTO: 0.03 10*3/MM3 (ref 0–0.2)
BASOPHILS NFR BLD AUTO: 0.4 % (ref 0–1.5)
BILIRUB SERPL-MCNC: 0.4 MG/DL (ref 0–1.2)
BUN SERPL-MCNC: 13 MG/DL (ref 6–20)
BUN/CREAT SERPL: 14.6 (ref 7–25)
CALCIUM SPEC-SCNC: 9.2 MG/DL (ref 8.6–10.5)
CHLORIDE SERPL-SCNC: 103 MMOL/L (ref 98–107)
CO2 SERPL-SCNC: 25.1 MMOL/L (ref 22–29)
CREAT SERPL-MCNC: 0.89 MG/DL (ref 0.76–1.27)
DEPRECATED RDW RBC AUTO: 43.8 FL (ref 37–54)
EOSINOPHIL # BLD AUTO: 0.14 10*3/MM3 (ref 0–0.4)
EOSINOPHIL NFR BLD AUTO: 2 % (ref 0.3–6.2)
ERYTHROCYTE [DISTWIDTH] IN BLOOD BY AUTOMATED COUNT: 13.8 % (ref 12.3–15.4)
GFR SERPL CREATININE-BSD FRML MDRD: 89 ML/MIN/1.73
GLOBULIN UR ELPH-MCNC: 3.1 GM/DL
GLUCOSE SERPL-MCNC: 100 MG/DL (ref 65–99)
HCT VFR BLD AUTO: 43.6 % (ref 37.5–51)
HGB BLD-MCNC: 15.3 G/DL (ref 13–17.7)
IMM GRANULOCYTES # BLD AUTO: 0.01 10*3/MM3 (ref 0–0.05)
IMM GRANULOCYTES NFR BLD AUTO: 0.1 % (ref 0–0.5)
LYMPHOCYTES # BLD AUTO: 3.27 10*3/MM3 (ref 0.7–3.1)
LYMPHOCYTES NFR BLD AUTO: 45.7 % (ref 19.6–45.3)
MCH RBC QN AUTO: 30.6 PG (ref 26.6–33)
MCHC RBC AUTO-ENTMCNC: 35.1 G/DL (ref 31.5–35.7)
MCV RBC AUTO: 87.2 FL (ref 79–97)
MONOCYTES # BLD AUTO: 0.58 10*3/MM3 (ref 0.1–0.9)
MONOCYTES NFR BLD AUTO: 8.1 % (ref 5–12)
NEUTROPHILS NFR BLD AUTO: 3.12 10*3/MM3 (ref 1.7–7)
NEUTROPHILS NFR BLD AUTO: 43.7 % (ref 42.7–76)
NRBC BLD AUTO-RTO: 0 /100 WBC (ref 0–0.2)
PLATELET # BLD AUTO: 161 10*3/MM3 (ref 140–450)
PMV BLD AUTO: 9.5 FL (ref 6–12)
POTASSIUM SERPL-SCNC: 4 MMOL/L (ref 3.5–5.2)
PROT SERPL-MCNC: 7.3 G/DL (ref 6–8.5)
RBC # BLD AUTO: 5 10*6/MM3 (ref 4.14–5.8)
SODIUM SERPL-SCNC: 138 MMOL/L (ref 136–145)
WBC # BLD AUTO: 7.15 10*3/MM3 (ref 3.4–10.8)

## 2020-07-20 PROCEDURE — 87536 HIV-1 QUANT&REVRSE TRNSCRPJ: CPT | Performed by: INTERNAL MEDICINE

## 2020-07-20 PROCEDURE — 85025 COMPLETE CBC W/AUTO DIFF WBC: CPT | Performed by: INTERNAL MEDICINE

## 2020-07-20 PROCEDURE — 86361 T CELL ABSOLUTE COUNT: CPT | Performed by: INTERNAL MEDICINE

## 2020-07-20 PROCEDURE — 36415 COLL VENOUS BLD VENIPUNCTURE: CPT | Performed by: INTERNAL MEDICINE

## 2020-07-20 PROCEDURE — 80053 COMPREHEN METABOLIC PANEL: CPT | Performed by: INTERNAL MEDICINE

## 2020-07-20 PROCEDURE — 99213 OFFICE O/P EST LOW 20 MIN: CPT | Performed by: INTERNAL MEDICINE

## 2020-07-20 NOTE — PROGRESS NOTES
"CC: f/u HIV    History of present illness:  Cornelio is a very nice 54 YOM here for f/u HIV care. He continues to tolerate well the Descovy (TAF/FTC) and Tivicay (DTG). He denies missed doses. Still getting refills through [facility] without any delays or cost troubles. No side effects of medications.     Since last visit he saw a cardiologist. He was told there were no serious findings. TTE EF 63%. His Holter monitor report was read as normal.     Weight is stable at 315#.     Review of Systems  No n/v/d    PMH:  HIV/AIDS on ART  Kaposi's sarcoma s/p chemotherapy many years ago  Melanoma s/p removal  Hematochezia  OA  MIAN w/ CPAP  Cellulitis  Obesity    PSH:  [place] tooth extraction    Social History:  No smoking  No ETOH  No illicits  Works in retail store  Has 2 dogs at home  Not sexually active at this time    Family History:  Mom: breast cancer  Dad: MI    Allergies:  NKDA    Medications:    Current Outpatient Medications:   •  dolutegravir (TIVICAY) 50 MG tablet, Take 50 mg by mouth Daily., Disp: , Rfl:   •  Emtricitabine-Tenofovir AF (DESCOVY) 200-25 MG per tablet, Take 1 tablet by mouth Daily., Disp: , Rfl:   •  oxymetazoline (AFRIN) 0.05 % nasal spray, 2 sprays into each nostril 2 (Two) Times a Day., Disp: , Rfl:   •  vitamin D (ERGOCALCIFEROL) 65100 units capsule capsule, TAKE 1 CAPSULE BY MOUTH EVERY 7 DAYS FOR 8 DOSES, Disp: 12 capsule, Rfl: 0    Objective   Vital Signs   /80   Pulse 73   Temp 98.3 °F (36.8 °C)   Ht 172.7 cm (67.99\")   Wt (!) 143 kg (314 lb 9.6 oz)   BMI 47.85 kg/m²     Physical Exam:   General: awake, alert, NAD  Eyes: no scleral icterus  ENT: wearing mask  Cardiovascular: NR  Respiratory: normal work of breathing on ambient air  GI: Abdomen is non-distended  Musculoskeletal: normal musculature  Skin: No rashes  Neurological: Alert and oriented x 3  Psychiatric: Normal mood and affect     Labs:   CBC, CMP, HIV labs reviewed today  Lab Results   Component Value Date    WBC 6.8 " 01/14/2020    WBC 7.23 01/14/2020    HGB 15.9 01/14/2020    HGB 15.5 01/14/2020    HCT 44.0 01/14/2020    HCT 45.4 01/14/2020    MCV 86 01/14/2020    MCV 88.7 01/14/2020     01/14/2020     01/14/2020     Lab Results   Component Value Date    GLUCOSE 114 (H) 02/05/2020    BUN 13 02/05/2020    CREATININE 1.12 02/05/2020    EGFRIFNONA 68 02/05/2020    BCR 11.6 02/05/2020    K 4.7 02/05/2020    CO2 26.6 02/05/2020    CALCIUM 9.7 02/05/2020    ALBUMIN 4.30 01/14/2020    AST 35 01/14/2020    ALT 54 (H) 01/14/2020     HIV Labs:  CD4 - 260/9% (1/2020)  VL - undetectable (3/2020)  Genotype - wild-type (12/2016)  Hep A naive (7/2017)  Hep B sAg - negative (7/2017)  Hep B sAb positive after vaccination (7/2019)  Hep C Ab - negative (1/2020)  Tspot negative (1/2020)  RPR non-reactive (1/2020)    Assessment/Plan   1. HIV  -continue Descovy (TAF/FTC)  mg and Tivicay (DTG) 50 mg PO daily through [facility]; stressed 100% compliance  -Labs today: CBC, CD4 count, CMP, HIV RNA today  -needs Prevnar-13 today followed by PVX-23 in one year    2. Morbid obesity  -weight stable at 315#    3. Fatty liver disease  -CMP today    RTC 6 months or sooner if needed.

## 2020-07-21 LAB
BASOPHILS # BLD AUTO: 0 X10E3/UL (ref 0–0.2)
BASOPHILS NFR BLD AUTO: 0 %
CD3+CD4+ CELLS # BLD: 326 /UL (ref 359–1519)
CD3+CD4+ CELLS NFR BLD: 10.5 % (ref 30.8–58.5)
EOSINOPHIL # BLD AUTO: 0.2 X10E3/UL (ref 0–0.4)
EOSINOPHIL # BLD AUTO: 3 %
ERYTHROCYTE [DISTWIDTH] IN BLOOD BY AUTOMATED COUNT: 13.1 % (ref 11.6–15.4)
HCT VFR BLD AUTO: 44.3 % (ref 37.5–51)
HGB BLD-MCNC: 15.3 G/DL (ref 13–17.7)
IMM GRANULOCYTES # BLD: 0 X10E3/UL (ref 0–0.1)
IMM GRANULOCYTES NFR BLD: 0 %
LYMPHOCYTES # BLD AUTO: 3.1 X10E3/UL (ref 0.7–3.1)
LYMPHOCYTES NFR BLD AUTO: 46 %
MCH RBC QN AUTO: 29.9 PG (ref 26.6–33)
MCHC RBC AUTO-ENTMCNC: 34.5 G/DL (ref 31.5–35.7)
MCV RBC AUTO: 87 FL (ref 79–97)
MONOCYTES # BLD AUTO: 0.6 X10E3/UL (ref 0.1–0.9)
MONOCYTES NFR BLD AUTO: 8 %
NEUTROPHILS # BLD AUTO: 2.9 X10E3/UL (ref 1.4–7)
NEUTROPHILS NFR BLD AUTO: 43 %
PLATELET # BLD AUTO: 162 X10E3/UL (ref 150–450)
RBC # BLD AUTO: 5.12 X10E6/UL (ref 4.14–5.8)
WBC # BLD AUTO: 6.8 X10E3/UL (ref 3.4–10.8)

## 2020-07-23 LAB
HIV1 RNA # SERPL NAA+PROBE: <20 COPIES/ML
LOG10 HIV-1 RNA: NORMAL

## 2021-03-05 ENCOUNTER — TELEPHONE (OUTPATIENT)
Dept: INFECTIOUS DISEASES | Facility: CLINIC | Age: 56
End: 2021-03-05

## 2021-03-05 NOTE — TELEPHONE ENCOUNTER
Spoke with Rhonda at [place] to verify patient's insurance, as there were connectivity issues.    Per Rhonda, the patient is actively covered by [employer] # [id number]  ID [id number] is effective 1/1/21.    Ref#[id number]

## 2021-03-08 ENCOUNTER — LAB (OUTPATIENT)
Dept: LAB | Facility: HOSPITAL | Age: 56
End: 2021-03-08

## 2021-03-08 ENCOUNTER — OFFICE VISIT (OUTPATIENT)
Dept: INFECTIOUS DISEASES | Facility: CLINIC | Age: 56
End: 2021-03-08

## 2021-03-08 VITALS
DIASTOLIC BLOOD PRESSURE: 76 MMHG | SYSTOLIC BLOOD PRESSURE: 124 MMHG | WEIGHT: 315 LBS | BODY MASS INDEX: 47.74 KG/M2 | HEART RATE: 74 BPM | HEIGHT: 68 IN | TEMPERATURE: 97.1 F

## 2021-03-08 DIAGNOSIS — Z21 ASYMPTOMATIC HIV INFECTION (HCC): Primary | ICD-10-CM

## 2021-03-08 DIAGNOSIS — Z79.2 LONG TERM (CURRENT) USE OF ANTIBIOTICS: ICD-10-CM

## 2021-03-08 DIAGNOSIS — E66.01 MORBID OBESITY (HCC): ICD-10-CM

## 2021-03-08 LAB
ALBUMIN SERPL-MCNC: 4 G/DL (ref 3.5–5.2)
ALBUMIN/GLOB SERPL: 1.3 G/DL
ALP SERPL-CCNC: 52 U/L (ref 39–117)
ALT SERPL W P-5'-P-CCNC: 87 U/L (ref 1–41)
ANION GAP SERPL CALCULATED.3IONS-SCNC: 7.8 MMOL/L (ref 5–15)
AST SERPL-CCNC: 60 U/L (ref 1–40)
BASOPHILS # BLD AUTO: 0.03 10*3/MM3 (ref 0–0.2)
BASOPHILS NFR BLD AUTO: 0.5 % (ref 0–1.5)
BILIRUB SERPL-MCNC: 0.6 MG/DL (ref 0–1.2)
BILIRUB UR QL STRIP: NEGATIVE
BUN SERPL-MCNC: 11 MG/DL (ref 6–20)
BUN/CREAT SERPL: 10.4 (ref 7–25)
CALCIUM SPEC-SCNC: 8.8 MG/DL (ref 8.6–10.5)
CHLORIDE SERPL-SCNC: 103 MMOL/L (ref 98–107)
CLARITY UR: CLEAR
CO2 SERPL-SCNC: 27.2 MMOL/L (ref 22–29)
COLOR UR: YELLOW
CREAT SERPL-MCNC: 1.06 MG/DL (ref 0.76–1.27)
DEPRECATED RDW RBC AUTO: 44.2 FL (ref 37–54)
EOSINOPHIL # BLD AUTO: 0.12 10*3/MM3 (ref 0–0.4)
EOSINOPHIL NFR BLD AUTO: 2 % (ref 0.3–6.2)
ERYTHROCYTE [DISTWIDTH] IN BLOOD BY AUTOMATED COUNT: 14.1 % (ref 12.3–15.4)
GFR SERPL CREATININE-BSD FRML MDRD: 72 ML/MIN/1.73
GLOBULIN UR ELPH-MCNC: 3 GM/DL
GLUCOSE SERPL-MCNC: 99 MG/DL (ref 65–99)
GLUCOSE UR STRIP-MCNC: NEGATIVE MG/DL
HCT VFR BLD AUTO: 44.4 % (ref 37.5–51)
HCV AB SER DONR QL: NORMAL
HGB BLD-MCNC: 15.4 G/DL (ref 13–17.7)
HGB UR QL STRIP.AUTO: NEGATIVE
IMM GRANULOCYTES # BLD AUTO: 0.01 10*3/MM3 (ref 0–0.05)
IMM GRANULOCYTES NFR BLD AUTO: 0.2 % (ref 0–0.5)
KETONES UR QL STRIP: NEGATIVE
LEUKOCYTE ESTERASE UR QL STRIP.AUTO: NEGATIVE
LYMPHOCYTES # BLD AUTO: 2.69 10*3/MM3 (ref 0.7–3.1)
LYMPHOCYTES NFR BLD AUTO: 44.4 % (ref 19.6–45.3)
MCH RBC QN AUTO: 30.4 PG (ref 26.6–33)
MCHC RBC AUTO-ENTMCNC: 34.7 G/DL (ref 31.5–35.7)
MCV RBC AUTO: 87.7 FL (ref 79–97)
MONOCYTES # BLD AUTO: 0.49 10*3/MM3 (ref 0.1–0.9)
MONOCYTES NFR BLD AUTO: 8.1 % (ref 5–12)
NEUTROPHILS NFR BLD AUTO: 2.72 10*3/MM3 (ref 1.7–7)
NEUTROPHILS NFR BLD AUTO: 44.8 % (ref 42.7–76)
NITRITE UR QL STRIP: NEGATIVE
NRBC BLD AUTO-RTO: 0 /100 WBC (ref 0–0.2)
PH UR STRIP.AUTO: 6 [PH] (ref 5–8)
PLATELET # BLD AUTO: 143 10*3/MM3 (ref 140–450)
PMV BLD AUTO: 9.7 FL (ref 6–12)
POTASSIUM SERPL-SCNC: 4.6 MMOL/L (ref 3.5–5.2)
PROT SERPL-MCNC: 7 G/DL (ref 6–8.5)
PROT UR QL STRIP: NEGATIVE
RBC # BLD AUTO: 5.06 10*6/MM3 (ref 4.14–5.8)
RPR SER QL: NORMAL
SODIUM SERPL-SCNC: 138 MMOL/L (ref 136–145)
SP GR UR STRIP: 1.02 (ref 1–1.03)
UROBILINOGEN UR QL STRIP: NORMAL
WBC # BLD AUTO: 6.06 10*3/MM3 (ref 3.4–10.8)

## 2021-03-08 PROCEDURE — 36415 COLL VENOUS BLD VENIPUNCTURE: CPT | Performed by: INTERNAL MEDICINE

## 2021-03-08 PROCEDURE — 86361 T CELL ABSOLUTE COUNT: CPT | Performed by: INTERNAL MEDICINE

## 2021-03-08 PROCEDURE — 86803 HEPATITIS C AB TEST: CPT | Performed by: INTERNAL MEDICINE

## 2021-03-08 PROCEDURE — 87591 N.GONORRHOEAE DNA AMP PROB: CPT | Performed by: INTERNAL MEDICINE

## 2021-03-08 PROCEDURE — 86592 SYPHILIS TEST NON-TREP QUAL: CPT | Performed by: INTERNAL MEDICINE

## 2021-03-08 PROCEDURE — 86481 TB AG RESPONSE T-CELL SUSP: CPT | Performed by: INTERNAL MEDICINE

## 2021-03-08 PROCEDURE — 81003 URINALYSIS AUTO W/O SCOPE: CPT | Performed by: INTERNAL MEDICINE

## 2021-03-08 PROCEDURE — 99213 OFFICE O/P EST LOW 20 MIN: CPT | Performed by: INTERNAL MEDICINE

## 2021-03-08 PROCEDURE — 80053 COMPREHEN METABOLIC PANEL: CPT | Performed by: INTERNAL MEDICINE

## 2021-03-08 PROCEDURE — 87536 HIV-1 QUANT&REVRSE TRNSCRPJ: CPT | Performed by: INTERNAL MEDICINE

## 2021-03-08 PROCEDURE — 85025 COMPLETE CBC W/AUTO DIFF WBC: CPT | Performed by: INTERNAL MEDICINE

## 2021-03-08 PROCEDURE — 87491 CHLMYD TRACH DNA AMP PROBE: CPT | Performed by: INTERNAL MEDICINE

## 2021-03-08 NOTE — PROGRESS NOTES
"CC: f/u HIV    History of present illness:  Caleb is a 55 YOM here for f/u HIV care. He continues to tolerate the Descovy (TAF/FTC) and Tivicay (DTG) without any adverse effects. He denies missed doses. Still getting refills through [facility] without any delays or cost troubles. His insurance did change recently.    He has not had COVID-19. A few weeks ago there was concern for a possible exposure. He tested negative. He hopes to get the vaccine soon.    Weight increased to #324.     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  Not sexually active at this time    Family History:  Mom: breast cancer  Dad: MI    Allergies:  NKDA    Medications:    Current Outpatient Medications:   •  dolutegravir (TIVICAY) 50 MG tablet, Take 50 mg by mouth Daily., Disp: , Rfl:   •  Emtricitabine-Tenofovir AF (DESCOVY) 200-25 MG per tablet, Take 1 tablet by mouth Daily., Disp: , Rfl:   •  oxymetazoline (AFRIN) 0.05 % nasal spray, 2 sprays into each nostril 2 (Two) Times a Day., Disp: , Rfl:   •  vitamin D (ERGOCALCIFEROL) 34470 units capsule capsule, TAKE 1 CAPSULE BY MOUTH EVERY 7 DAYS FOR 8 DOSES, Disp: 12 capsule, Rfl: 0    Objective   Vital Signs   /76   Pulse 74   Temp 97.1 °F (36.2 °C)   Ht 172.7 cm (67.99\")   Wt (!) 147 kg (324 lb 3.2 oz)   BMI 49.31 kg/m²     Physical Exam:   General: awake, alert, NAD  Eyes: no scleral icterus  ENT: wearing mask  Cardiovascular: NR  Respiratory: normal work of breathing on ambient air  GI: Abdomen is obese  Skin: No rashes  Neurological: Alert and oriented x 3  Psychiatric: Normal mood and affect     Labs:   CBC, CMP, HIV labs reviewed today  Lab Results "   Component Value Date    WBC 7.15 07/20/2020    HGB 15.3 07/20/2020    HCT 43.6 07/20/2020    MCV 87.2 07/20/2020     07/20/2020     Lab Results   Component Value Date    GLUCOSE 100 (H) 07/20/2020    BUN 13 07/20/2020    CREATININE 0.89 07/20/2020    EGFRIFNONA 89 07/20/2020    BCR 14.6 07/20/2020    K 4.0 07/20/2020    CO2 25.1 07/20/2020    CALCIUM 9.2 07/20/2020    ALBUMIN 4.20 07/20/2020    AST 38 07/20/2020    ALT 58 (H) 07/20/2020     HIV Labs:  CD4 - 326/10% (7/2020)  VL - undetectable (7/2020)  Genotype - wild-type (12/2016)  Hep A naive (7/2017) but has now finished vaccine series as of 2018  Hep B sAb positive after vaccination (7/2019)  Hep C Ab - negative (1/2020)  Tspot negative (1/2020)  RPR non-reactive (1/2020)    Assessment/Plan   1. HIV  -continue Descovy (TAF/FTC)  mg and Tivicay (DTG) 50 mg PO daily through [facility]  -Labs today: CBC, CD4 count, CMP, HIV RNA, UA, urine GC/CT NAAT, Tspot, RPR, and Hep C Ab today  -plans to get COVID-19 vaccine soon  -needs Prevnar-13 followed by PVX-23 in one year    2. Morbid obesity  -weight increased to #324    3. Fatty liver disease  -CMP today    4. Long term use of antibiotic  -labs as above    RTC 6 months or sooner if needed.

## 2021-03-09 LAB
BASOPHILS # BLD AUTO: 0 X10E3/UL (ref 0–0.2)
BASOPHILS NFR BLD AUTO: 1 %
C TRACH RRNA SPEC QL NAA+PROBE: NEGATIVE
CD3+CD4+ CELLS # BLD: 284 /UL (ref 359–1519)
CD3+CD4+ CELLS NFR BLD: 10.5 % (ref 30.8–58.5)
EOSINOPHIL # BLD AUTO: 0.1 X10E3/UL (ref 0–0.4)
EOSINOPHIL NFR BLD AUTO: 2 %
ERYTHROCYTE [DISTWIDTH] IN BLOOD BY AUTOMATED COUNT: 13.7 % (ref 11.6–15.4)
HCT VFR BLD AUTO: 44.3 % (ref 37.5–51)
HGB BLD-MCNC: 15.3 G/DL (ref 13–17.7)
HIV1 RNA # SERPL NAA+PROBE: <20 COPIES/ML
HIV1 RNA SERPL NAA+PROBE-LOG#: NORMAL {LOG_COPIES}/ML
IMM GRANULOCYTES # BLD AUTO: 0 X10E3/UL (ref 0–0.1)
IMM GRANULOCYTES NFR BLD AUTO: 0 %
LYMPHOCYTES # BLD AUTO: 2.7 X10E3/UL (ref 0.7–3.1)
LYMPHOCYTES NFR BLD AUTO: 43 %
MCH RBC QN AUTO: 29.9 PG (ref 26.6–33)
MCHC RBC AUTO-ENTMCNC: 34.5 G/DL (ref 31.5–35.7)
MCV RBC AUTO: 87 FL (ref 79–97)
MONOCYTES # BLD AUTO: 0.5 X10E3/UL (ref 0.1–0.9)
MONOCYTES NFR BLD AUTO: 9 %
N GONORRHOEA RRNA SPEC QL NAA+PROBE: NEGATIVE
NEUTROPHILS # BLD AUTO: 2.8 X10E3/UL (ref 1.4–7)
NEUTROPHILS NFR BLD AUTO: 45 %
PLATELET # BLD AUTO: 162 X10E3/UL (ref 150–450)
RBC # BLD AUTO: 5.11 X10E6/UL (ref 4.14–5.8)
WBC # BLD AUTO: 6.2 X10E3/UL (ref 3.4–10.8)

## 2021-03-10 LAB
TSPOT INTERPRETATION: NEGATIVE
TSPOT NIL CONTROL INTERPRETATION: NORMAL
TSPOT PANEL A: 0
TSPOT PANEL B: 0
TSPOT POS CONTROL INTERPRETATION: NORMAL

## 2021-09-03 ENCOUNTER — TELEPHONE (OUTPATIENT)
Dept: INFECTIOUS DISEASES | Facility: CLINIC | Age: 56
End: 2021-09-03

## 2021-09-03 NOTE — TELEPHONE ENCOUNTER
LVM with Dr. Holm's response to pt request for letter stating that he does not need to wear a mask at work.

## 2021-09-03 NOTE — TELEPHONE ENCOUNTER
Pt states that his company is requiring employees to wear mask again.  Pt is wanting to know if Dr. Holm would write him a note stating that he does not need to wear a mask since he is prone to bronchitis and has trouble breathing while wearing mask.  Pt does not have a PCP.

## 2021-09-07 ENCOUNTER — LAB (OUTPATIENT)
Dept: LAB | Facility: HOSPITAL | Age: 56
End: 2021-09-07

## 2021-09-07 ENCOUNTER — OFFICE VISIT (OUTPATIENT)
Dept: INFECTIOUS DISEASES | Facility: CLINIC | Age: 56
End: 2021-09-07

## 2021-09-07 VITALS
TEMPERATURE: 97.7 F | WEIGHT: 315 LBS | HEART RATE: 67 BPM | SYSTOLIC BLOOD PRESSURE: 127 MMHG | DIASTOLIC BLOOD PRESSURE: 78 MMHG | BODY MASS INDEX: 47.74 KG/M2 | HEIGHT: 68 IN | RESPIRATION RATE: 14 BRPM

## 2021-09-07 DIAGNOSIS — Z21 ASYMPTOMATIC HIV INFECTION (HCC): Primary | ICD-10-CM

## 2021-09-07 DIAGNOSIS — E66.01 MORBID OBESITY (HCC): ICD-10-CM

## 2021-09-07 DIAGNOSIS — Z79.2 LONG TERM (CURRENT) USE OF ANTIBIOTICS: ICD-10-CM

## 2021-09-07 LAB
ANION GAP SERPL CALCULATED.3IONS-SCNC: 8.6 MMOL/L (ref 5–15)
BASOPHILS # BLD AUTO: 0.02 10*3/MM3 (ref 0–0.2)
BASOPHILS NFR BLD AUTO: 0.2 % (ref 0–1.5)
BUN SERPL-MCNC: 10 MG/DL (ref 6–20)
BUN/CREAT SERPL: 9.6 (ref 7–25)
CALCIUM SPEC-SCNC: 9.3 MG/DL (ref 8.6–10.5)
CHLORIDE SERPL-SCNC: 101 MMOL/L (ref 98–107)
CHOLEST SERPL-MCNC: 184 MG/DL (ref 0–200)
CO2 SERPL-SCNC: 26.4 MMOL/L (ref 22–29)
CREAT SERPL-MCNC: 1.04 MG/DL (ref 0.76–1.27)
DEPRECATED RDW RBC AUTO: 43.6 FL (ref 37–54)
EOSINOPHIL # BLD AUTO: 0.18 10*3/MM3 (ref 0–0.4)
EOSINOPHIL NFR BLD AUTO: 2.1 % (ref 0.3–6.2)
ERYTHROCYTE [DISTWIDTH] IN BLOOD BY AUTOMATED COUNT: 13.4 % (ref 12.3–15.4)
GFR SERPL CREATININE-BSD FRML MDRD: 74 ML/MIN/1.73
GLUCOSE SERPL-MCNC: 92 MG/DL (ref 65–99)
HCT VFR BLD AUTO: 44.4 % (ref 37.5–51)
HDLC SERPL-MCNC: 33 MG/DL (ref 40–60)
HGB BLD-MCNC: 15 G/DL (ref 13–17.7)
IMM GRANULOCYTES # BLD AUTO: 0.03 10*3/MM3 (ref 0–0.05)
IMM GRANULOCYTES NFR BLD AUTO: 0.4 % (ref 0–0.5)
LDLC SERPL CALC-MCNC: 124 MG/DL (ref 0–100)
LDLC/HDLC SERPL: 3.68 {RATIO}
LYMPHOCYTES # BLD AUTO: 3.28 10*3/MM3 (ref 0.7–3.1)
LYMPHOCYTES NFR BLD AUTO: 39.1 % (ref 19.6–45.3)
MCH RBC QN AUTO: 30 PG (ref 26.6–33)
MCHC RBC AUTO-ENTMCNC: 33.8 G/DL (ref 31.5–35.7)
MCV RBC AUTO: 88.8 FL (ref 79–97)
MONOCYTES # BLD AUTO: 0.55 10*3/MM3 (ref 0.1–0.9)
MONOCYTES NFR BLD AUTO: 6.6 % (ref 5–12)
NEUTROPHILS NFR BLD AUTO: 4.32 10*3/MM3 (ref 1.7–7)
NEUTROPHILS NFR BLD AUTO: 51.6 % (ref 42.7–76)
NRBC BLD AUTO-RTO: 0 /100 WBC (ref 0–0.2)
PLATELET # BLD AUTO: 151 10*3/MM3 (ref 140–450)
PMV BLD AUTO: 9.6 FL (ref 6–12)
POTASSIUM SERPL-SCNC: 3.9 MMOL/L (ref 3.5–5.2)
RBC # BLD AUTO: 5 10*6/MM3 (ref 4.14–5.8)
SODIUM SERPL-SCNC: 136 MMOL/L (ref 136–145)
TRIGL SERPL-MCNC: 147 MG/DL (ref 0–150)
VLDLC SERPL-MCNC: 27 MG/DL (ref 5–40)
WBC # BLD AUTO: 8.38 10*3/MM3 (ref 3.4–10.8)

## 2021-09-07 PROCEDURE — 87536 HIV-1 QUANT&REVRSE TRNSCRPJ: CPT | Performed by: INTERNAL MEDICINE

## 2021-09-07 PROCEDURE — 80048 BASIC METABOLIC PNL TOTAL CA: CPT | Performed by: INTERNAL MEDICINE

## 2021-09-07 PROCEDURE — 86361 T CELL ABSOLUTE COUNT: CPT | Performed by: INTERNAL MEDICINE

## 2021-09-07 PROCEDURE — 36415 COLL VENOUS BLD VENIPUNCTURE: CPT | Performed by: INTERNAL MEDICINE

## 2021-09-07 PROCEDURE — 85025 COMPLETE CBC W/AUTO DIFF WBC: CPT | Performed by: INTERNAL MEDICINE

## 2021-09-07 PROCEDURE — 99213 OFFICE O/P EST LOW 20 MIN: CPT | Performed by: INTERNAL MEDICINE

## 2021-09-07 PROCEDURE — 80061 LIPID PANEL: CPT | Performed by: INTERNAL MEDICINE

## 2021-09-07 RX ORDER — EMTRICITABINE AND TENOFOVIR ALAFENAMIDE 200; 25 MG/1; MG/1
TABLET ORAL DAILY
COMMUNITY
End: 2022-10-06

## 2021-09-07 NOTE — PROGRESS NOTES
"CC: f/u HIV    History of present illness:  Caleb is a 55 YOM here for f/u HIV care. He continues to tolerate the Descovy (TAF/FTC) and Tivicay (DTG) without any adverse effects. [facility] No missed doses.     He is vaccinated with Moderna.    He says he's still struggling w/ his weight. Nighttime eating is a big culprit. I gave him the info for [facility] [employer].     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  Not sexually active at this time    Family History:  Mom: breast cancer  Dad: MI    Allergies:  NKDA    Medications:    Current Outpatient Medications:   •  Dolutegravir Sodium (TIVICAY PO), Take  by mouth Daily., Disp: , Rfl:   •  Emtricitabine-Tenofovir AF (Descovy) 200-25 MG per tablet, Take  by mouth Daily., Disp: , Rfl:   •  oxymetazoline (AFRIN) 0.05 % nasal spray, 2 sprays into each nostril 2 (Two) Times a Day., Disp: , Rfl:   •  vitamin D (ERGOCALCIFEROL) 02575 units capsule capsule, TAKE 1 CAPSULE BY MOUTH EVERY 7 DAYS FOR 8 DOSES, Disp: 12 capsule, Rfl: 0    Objective   Vital Signs   /78   Pulse 67   Temp 97.7 °F (36.5 °C)   Resp 14   Ht 172.7 cm (68\")   Wt (!) 143 kg (315 lb 6.4 oz)   BMI 47.96 kg/m²     Physical Exam:   General: awake, alert, NAD  Eyes: no scleral icterus  ENT: wearing mask  Cardiovascular: NR  Respiratory: normal work of breathing on ambient air  GI: Abdomen is obese  Skin: No rashes  Neurological: Alert and oriented x 3  Psychiatric: Normal mood and affect     Labs:   CBC, CMP, HIV labs reviewed today  Lab Results   Component Value Date    WBC 6.2 03/08/2021    WBC 6.06 03/08/2021    HGB 15.3 03/08/2021    " HGB 15.4 03/08/2021    HCT 44.3 03/08/2021    HCT 44.4 03/08/2021    MCV 87 03/08/2021    MCV 87.7 03/08/2021     03/08/2021     03/08/2021     Lab Results   Component Value Date    GLUCOSE 99 03/08/2021    BUN 11 03/08/2021    CREATININE 1.06 03/08/2021    EGFRIFNONA 72 03/08/2021    BCR 10.4 03/08/2021    K 4.6 03/08/2021    CO2 27.2 03/08/2021    CALCIUM 8.8 03/08/2021    ALBUMIN 4.00 03/08/2021    AST 60 (H) 03/08/2021    ALT 87 (H) 03/08/2021     UA no protein (3/2021)    HIV Labs:  CD4 - 284/10% (3/2021)  VL - undetectable (3/2021)  Genotype - wild-type (12/2016)  Hep A naive (7/2017) but has now finished vaccine series as of 2018  Hep B sAb positive after vaccination (7/2019)  Hep C Ab - negative (3/2021)  Tspot negative (3/2021)  RPR non-reactive (3/2021)  Urine GC/CT NAAT: negative (3/2021)    Assessment/Plan   1. HIV  -continue Descovy (TAF/FTC)  mg and Tivicay (DTG) 50 mg PO daily through [facility]  -Labs today: CBC, CD4 count, CMP, HIV RNA      2. Morbid obesity BMI 47  -gave info re: [facility] [employer]    3. Fatty liver disease  -CMP today    4. Long term use of antibiotic  -labs as above    I gave him the name of a new PCP in the [facility], Dr iRch. I really encouraged him to establish with a primary care physician for his other health needs besides HIV.     RTC 6 months or sooner if needed.

## 2021-09-08 LAB
BASOPHILS # BLD AUTO: 0 X10E3/UL (ref 0–0.2)
BASOPHILS NFR BLD AUTO: 1 %
CD3+CD4+ CELLS # BLD: 327 /UL (ref 359–1519)
CD3+CD4+ CELLS NFR BLD: 9.9 % (ref 30.8–58.5)
EOSINOPHIL # BLD AUTO: 0.2 X10E3/UL (ref 0–0.4)
EOSINOPHIL NFR BLD AUTO: 2 %
ERYTHROCYTE [DISTWIDTH] IN BLOOD BY AUTOMATED COUNT: 13.2 % (ref 11.6–15.4)
HCT VFR BLD AUTO: 43.8 % (ref 37.5–51)
HGB BLD-MCNC: 14.7 G/DL (ref 13–17.7)
HIV1 RNA # SERPL NAA+PROBE: 390 COPIES/ML
HIV1 RNA SERPL NAA+PROBE-LOG#: 2.59 LOG10COPY/ML
IMM GRANULOCYTES # BLD AUTO: 0 X10E3/UL (ref 0–0.1)
IMM GRANULOCYTES NFR BLD AUTO: 0 %
LYMPHOCYTES # BLD AUTO: 3.3 X10E3/UL (ref 0.7–3.1)
LYMPHOCYTES NFR BLD AUTO: 40 %
MCH RBC QN AUTO: 30 PG (ref 26.6–33)
MCHC RBC AUTO-ENTMCNC: 33.6 G/DL (ref 31.5–35.7)
MCV RBC AUTO: 89 FL (ref 79–97)
MONOCYTES # BLD AUTO: 0.7 X10E3/UL (ref 0.1–0.9)
MONOCYTES NFR BLD AUTO: 9 %
NEUTROPHILS # BLD AUTO: 4.1 X10E3/UL (ref 1.4–7)
NEUTROPHILS NFR BLD AUTO: 48 %
PLATELET # BLD AUTO: 161 X10E3/UL (ref 150–450)
RBC # BLD AUTO: 4.9 X10E6/UL (ref 4.14–5.8)
WBC # BLD AUTO: 8.4 X10E3/UL (ref 3.4–10.8)

## 2021-09-10 DIAGNOSIS — Z21 ASYMPTOMATIC HIV INFECTION (HCC): Primary | ICD-10-CM

## 2021-10-04 ENCOUNTER — OFFICE VISIT (OUTPATIENT)
Dept: INTERNAL MEDICINE | Facility: CLINIC | Age: 56
End: 2021-10-04

## 2021-10-04 VITALS
HEART RATE: 70 BPM | TEMPERATURE: 96.9 F | OXYGEN SATURATION: 97 % | SYSTOLIC BLOOD PRESSURE: 121 MMHG | WEIGHT: 313.8 LBS | DIASTOLIC BLOOD PRESSURE: 62 MMHG | HEIGHT: 68 IN | BODY MASS INDEX: 47.56 KG/M2

## 2021-10-04 DIAGNOSIS — Z00.00 ANNUAL PHYSICAL EXAM: ICD-10-CM

## 2021-10-04 DIAGNOSIS — F33.1 MODERATE EPISODE OF RECURRENT MAJOR DEPRESSIVE DISORDER (HCC): ICD-10-CM

## 2021-10-04 DIAGNOSIS — B35.1 ONYCHOMYCOSIS: ICD-10-CM

## 2021-10-04 DIAGNOSIS — E78.5 HYPERLIPIDEMIA, UNSPECIFIED HYPERLIPIDEMIA TYPE: Primary | ICD-10-CM

## 2021-10-04 DIAGNOSIS — B20 CURRENTLY ASYMPTOMATIC HIV INFECTION, WITH HISTORY OF HIV-RELATED ILLNESS (HCC): ICD-10-CM

## 2021-10-04 DIAGNOSIS — M72.2 PLANTAR FASCIITIS OF LEFT FOOT: ICD-10-CM

## 2021-10-04 DIAGNOSIS — L60.2 THICKENED NAILS: ICD-10-CM

## 2021-10-04 DIAGNOSIS — Z13.1 SCREENING FOR DIABETES MELLITUS: ICD-10-CM

## 2021-10-04 DIAGNOSIS — Z87.39 HISTORY OF ARTHRITIS: ICD-10-CM

## 2021-10-04 PROCEDURE — 99386 PREV VISIT NEW AGE 40-64: CPT | Performed by: STUDENT IN AN ORGANIZED HEALTH CARE EDUCATION/TRAINING PROGRAM

## 2021-10-04 PROCEDURE — 99204 OFFICE O/P NEW MOD 45 MIN: CPT | Performed by: STUDENT IN AN ORGANIZED HEALTH CARE EDUCATION/TRAINING PROGRAM

## 2021-10-04 RX ORDER — TERBINAFINE HYDROCHLORIDE 250 MG/1
250 TABLET ORAL DAILY
Qty: 90 TABLET | Refills: 0 | Status: SHIPPED | OUTPATIENT
Start: 2021-10-04 | End: 2022-01-24 | Stop reason: SDUPTHER

## 2021-10-04 RX ORDER — ATORVASTATIN CALCIUM 20 MG/1
20 TABLET, FILM COATED ORAL DAILY
Qty: 90 TABLET | Refills: 0 | Status: SHIPPED | OUTPATIENT
Start: 2021-10-04 | End: 2022-01-07 | Stop reason: SDUPTHER

## 2021-10-04 NOTE — PROGRESS NOTES
"    Chief Complaint  Annual checkup    HISTORY    Cornelio Viera is a 55 y.o. male who presents to the office today as a  a new patient for their annual preventative exam. Their last preventative exam was \" a long long time\" with Dr Pinky Dillard around 6 or so years ago.     No hospitalization(s) within the last year.       Other medical concerns today:     Knee Pain: Takes one step at a time, previously took celebrex, last time he took it was around 6 or 8 years ago. Feels like his knees are lightly bone on bone and that's what another family member has. His knee pain is only with activity, he feels it on the front and outside aspect of the knees. No swelling of the knee joint or skin redness.     Has had easily friability of his rectal lining per his report, not affected by food, occasionally has bright red blood in the toilet water. He reports having a colonoscopy before and this was confirmed.     Feels he is not able to get down to get his toenails any more due to his weight and inflexibility. Toenails are also thicker and he is cutting into the quick whenever he cuts them at home.     Also has some  Pain with walking on the bottom of his left foot. No injuries reported.     He states that depression is a major issue for him. He has tried employee sponsored programs before for talk therapy but they always require things on his part and he has been unable to complete it. Does not have SI or HI.     States that he doesn't want to get too involved with medical tests today due to cost, he has a new insurance provider and their benefits are not as good. He states that he had a cardiology workup a few years ago and had thousands of dollars in medical bills and is just getting caught up from that.     Health Maintenance Summary          Overdue - Pneumococcal Vaccine 0-64 (1 of 4 - PCV13) Overdue - never done    No completion history exists for this topic.          Overdue - ZOSTER VACCINE (1 of 2) Overdue - never " done    No completion history exists for this topic.          Overdue - TDAP/TD VACCINES (2 - Td or Tdap) Overdue since 2/20/2019 02/20/2009  Imm Admin: Tdap          Overdue - COVID-19 Vaccine (3 - Moderna risk 3-dose series) Overdue since 5/6/2021 04/08/2021  Imm Admin: COVID-19 (MODERNA)    03/10/2021  Imm Admin: COVID-19 (MODERNA)          INFLUENZA VACCINE (Yearly - October to March) Due since 10/1/2021    No completion history exists for this topic.          LIPID PANEL (Yearly) Next due on 9/7/2022 09/07/2021  Lipid Panel          ANNUAL PHYSICAL (Yearly) Next due on 10/5/2022    10/04/2021  Done          COLORECTAL CANCER SCREENING (COLONOSCOPY - Every 10 Years) Next due on 10/22/2028    10/22/2018  COLONOSCOPY (Done)    06/28/2018  SCANNED - COLONOSCOPY          Hepatitis B (Series Information) Completed    06/28/2018  Imm Admin: Hepatitis B Vaccine Adult IM    02/15/2018  Imm Admin: Hepatitis B Vaccine Adult IM    12/28/2017  Imm Admin: Hepatitis B Vaccine Adult IM          HEPATITIS C SCREENING  Completed    03/08/2021  Hepatitis C Antibody    01/14/2020  Hepatitis C Antibody    07/27/2018  Hepatitis C Antibody    07/19/2017  Hepatitis C Antibody    10/01/2016  Hep C Virus Ab                 No Known Allergies     Outpatient Medications Marked as Taking for the 10/4/21 encounter (Office Visit) with Bhaskar Rich DO   Medication Sig Dispense Refill   • Dolutegravir Sodium (TIVICAY PO) Take  by mouth Daily.     • Emtricitabine-Tenofovir AF (Descovy) 200-25 MG per tablet Take  by mouth Daily.     • oxymetazoline (AFRIN) 0.05 % nasal spray 2 sprays into each nostril 2 (Two) Times a Day.         Past Medical History:   Diagnosis Date   • Arthritis     knees   • Dyspnea on exertion    • Hepatic steatosis    • HIV (human immunodeficiency virus infection) (HCC) 2004    likely contracted in 1990   • Kaposi sarcoma (HCC)    • Rapid heart rate    • Skin cancer     believes it was basal cell carcinoma    •  "Sleep apnea     using CPAP     Past Surgical History:   Procedure Laterality Date   • TOENAIL EXCISION      multiple from infections   • WISDOM TOOTH EXTRACTION  1983     Family History   Problem Relation Age of Onset   • Cancer Mother         breast   • Depression Mother    • Hypertension Mother    • Heart attack Father 75   • Alcohol abuse Father    • Alzheimer's disease Maternal Uncle    • Depression Maternal Grandfather    • Depression Paternal Grandfather    • Liver disease Brother         fatty liver    reports that he has never smoked. He has never used smokeless tobacco. He reports current alcohol use. He reports previous drug use.    Immunization History   Administered Date(s) Administered   • COVID-19 (MODERNA) 03/10/2021, 04/08/2021   • Hepatitis A 12/28/2017, 06/28/2018   • Hepatitis B Vaccine Adult IM 12/28/2017, 02/15/2018, 06/28/2018   • Tdap 02/20/2009        OBJECTIVE    Vital Signs:   /62   Pulse 70   Temp 96.9 °F (36.1 °C) (Temporal)   Ht 172.7 cm (68\")   Wt (!) 142 kg (313 lb 12.8 oz)   SpO2 97%   BMI 47.71 kg/m²     Physical Exam  Vitals reviewed.   Constitutional:       General: He is not in acute distress.     Appearance: Normal appearance. He is obese.   HENT:      Head: Normocephalic and atraumatic.      Right Ear: Tympanic membrane, ear canal and external ear normal. There is no impacted cerumen.      Left Ear: Tympanic membrane, ear canal and external ear normal. There is no impacted cerumen.      Mouth/Throat:      Mouth: Mucous membranes are moist.      Pharynx: No oropharyngeal exudate or posterior oropharyngeal erythema.   Eyes:      General: No scleral icterus.     Extraocular Movements: Extraocular movements intact.      Conjunctiva/sclera: Conjunctivae normal.      Pupils: Pupils are equal, round, and reactive to light.   Cardiovascular:      Rate and Rhythm: Normal rate and regular rhythm.      Pulses:           Posterior tibial pulses are 2+ on the right side and 2+ on " the left side.      Heart sounds: Normal heart sounds. No murmur heard.     Pulmonary:      Effort: Pulmonary effort is normal. No respiratory distress.      Breath sounds: Normal breath sounds. No wheezing.   Abdominal:      General: Bowel sounds are normal. There is no distension.      Palpations: Abdomen is soft.      Tenderness: There is no abdominal tenderness. There is no guarding.   Musculoskeletal:      Cervical back: Neck supple.      Right knee: Crepitus present. No swelling, deformity, effusion or erythema. Normal range of motion. No tenderness.      Left knee: Crepitus present. No swelling, deformity, effusion or erythema. Normal range of motion. No tenderness.      Right lower leg: No edema.      Left lower leg: No edema.      Right foot: Normal range of motion. No deformity or bunion.      Left foot: Normal range of motion. No deformity or bunion.        Feet:    Feet:      Right foot:      Skin integrity: Callus present. No ulcer, blister, skin breakdown, erythema or warmth.      Toenail Condition: Right toenails are abnormally thick. Fungal disease present.     Left foot:      Skin integrity: Callus present. No ulcer, blister, skin breakdown or erythema.      Toenail Condition: Left toenails are abnormally thick. Fungal disease present.  Lymphadenopathy:      Cervical: No cervical adenopathy.   Skin:     General: Skin is warm and dry.      Coloration: Skin is not jaundiced.   Neurological:      General: No focal deficit present.      Mental Status: He is alert and oriented to person, place, and time.      Cranial Nerves: No cranial nerve deficit.      Motor: No weakness.   Psychiatric:         Mood and Affect: Mood normal.         Behavior: Behavior normal.         Thought Content: Thought content normal.            ASSESSMENT & PLAN     #Annual Preventative Health Examination  -Patient presents today for preventative health exam. The patient's last preventative health exam was 6 or more years ago.  Discussed with patient the importance of an annual preventative exam and encouraged continued yearly annual exams.   -Age and sex appropriate physical exam performed and documented. Updated past medical, family, social and surgical histories as well as allergies and care team list. Addressed care gaps listed in the medical record.  -Encouraged annual dental and vision exams as part of their overall health.  -Immunizations reviewed and updated in EMR.  The 10-year ASCVD risk score (Malia BELCHER Jr., et al., 2013) is: 7.2%    Values used to calculate the score:      Age: 55 years      Sex: Male      Is Non- : No      Diabetic: No      Tobacco smoker: No      Systolic Blood Pressure: 121 mmHg      Is BP treated: No      HDL Cholesterol: 33 mg/dL      Total Cholesterol: 184 mg/dL   -Lipid screening:   Lipid Panel    Lipid Panel 9/7/21   Total Cholesterol 184   Triglycerides 147   HDL Cholesterol 33 (A)   VLDL Cholesterol 27   LDL Cholesterol  124 (A)   LDL/HDL Ratio 3.68   (A) Abnormal value           Patient is over age 40 without diabetes or known ASCVD and an ASCVD risk calculation was completed and it does indicate a need for statin therapy.   -Aspirin for primary or secondary prevention: Not indicated per USPSTF guidelines, ASCVD risk is not greater than 10 % over 10 years.   -Depression screening: PHQ2 performed and the patient's screen was positive.   -Diabetes screening:  Patient is 35-70 years of age and overweight, screening for diabetes is indicated every 3 years.   -Tobacco use screening: Patient denies cigarette use. Tobacco counseling was not indicated.   -Alcohol use screening: Patient reports drinking less than one per week.  -Illicit drug screening: Patient does not use illicit drugs.   -Abdominal aortic aneurysm screening: AAA screening is not indicated as patient is less than 65 years of age.  -Hypertension screening: Patient screened negative for HTN today.  -HIV screening: Patient  has known HIV infection, screening not indicated.   -Syphilis screening: Screened negative with RPR 3/2021  -Hepatitis B virus screening: Screened negative previously  -Hepatitis C virus screening:  Patient has already completed Hepatitis C screening. Negative   -Colon cancer screening: Patient is already up to date on their colon cancer screening with colonoscopy is indicated again in 2028  -Lung cancer screening: Patient has never smoked.  -Prostate cancer screening: Patient is 55-69 years old. I discussed with patient that the decision to undergo periodic prostate-specific antigen (PSA)-based screening for prostate cancer should be an individual one. Screening offers a small potential benefit of reducing the chance of death from prostate cancer in some men. However, many men will experience potential harms of screening, including false-positive results that require additional testing and possible prostate biopsy; overdiagnosis and overtreatment; and treatment complications, such as incontinence and erectile dysfunction. Based on results from the ERSPC trial, of 1000 men offered a PSA test, 1.3 deaths would be avoided. Patient elected to declined screening PSA.    Prefers to talk to his infectious disease specialist  Before doing immunizations.     Assessment and Plan for Problem Based Visit:   1. Hyperlipidemia, unspecified hyperlipidemia type (Primary)  -Lipid panel 9/7/21 significant for total cholesterol 184, , HDL 33, trig 147. 10 year ASCVD risk is 7.2% and likely underestimated due to patient's HIV status. Discussed with pt that HIV increases risk for coronary artery disease and that treatment with a statin based therapy is recommended. Discussed the risks (leg cramps, elevated liver enzymes) vs benefits (decreased cholesterol, improving risk of CAD and stroke). Pt was agreeable to me prescribing a statin but would like to think more about it before beginning. Will need to reassess at next visit to  determine if he began therapy or not. Will Rx atorvastatin 20 mg daily. Check CMP at next visit (he has history of fatty liver disease).   -     atorvastatin (LIPITOR) 20 MG tablet; Take 1 tablet by mouth Daily.  Dispense: 90 tablet; Refill: 0    2. Thickened toenails  3. Onychomycosis  -All toes bilateral feet.  Will Rx terbinafine. Will place referral to podiatry for nail trim.    -     terbinafine (lamiSIL) 250 MG tablet; Take 1 tablet by mouth Daily.  Dispense: 90 tablet; Refill: 0    4. Currently asymptomatic HIV infection, with history of HIV-related illness (HCC)  -Patient follows with [facility] infectious disease Dr. Holm.  Last visit there was September 7, 2021.  The patient reports compliance with Descovy and Tivicay.  He has a history of Kaposi's sarcoma.  Patient states that he was diagnosed in 2004 but likely contracted HIV in the early 90s.  His last BMP from this month demonstrated normal renal function as well as normal electrolytes, white blood cell count and hemoglobin were normal, CD4 count was 327 which was up from 284 earlier this year, HIV viral load was 390 which was up from an earlier undetectable viral load. Does have a partner and is not sexually active but they have an open relationship.   -Form patient that HIV infection and puts him at increased risk of serious illness such as pneumonia.  He would like to discuss his immunizations with his infectious disease doctor prior to initiating.  He would be a candidate for pneumonia 13 vaccine at first if he decides to accept.  -     DEXA Bone Density Axial; Future    5. History of arthritis  -Pt reports years long duration of knee arthritis and rates his pain as only a 1/10 today. Previously took celebrex. Today there is crepitus at bilateral knees but he prefers to not have xrays done due to cost concerns. He would rather be treated empirically for his minor pain for now. Will Rx a trial of diclofenac gel.   -     Diclofenac Sodium  (VOLTAREN) 1 % gel gel; Apply 4 g topically to the appropriate area as directed 4 (Four) Times a Day As Needed (pain).  Dispense: 100 g; Refill: 0    6. Plantar fasciitis of left foot  -L  to palpation medial arch on exam today. Typical symptoms. Will provide patient with exercise instructions for plantar fasciitis and reassess at next visit.   -Consider osteopathic treatment at next visit if no improvement.     7. Moderate episode of recurrent major depressive disorder (HCC)  -PHQ 15 and PANFILO 7 of 3 in the office today.No SI or HI. Pt has done employer-based talk therapy before per his report and was unsuccessful. He is interested in receiving a list of counseling services we have utilized in the past, will provide that today. Introduced the topic of pharmacologic therapy to the patient today, will readdress at next visit to determine if he has thought any more about that.           Patient/family had no further questions at this time and verbalized understanding of the plan discussed today.

## 2021-10-04 NOTE — PATIENT INSTRUCTIONS
Plantar Fasciitis Rehab  Ask your health care provider which exercises are safe for you. Do exercises exactly as told by your health care provider and adjust them as directed. It is normal to feel mild stretching, pulling, tightness, or discomfort as you do these exercises. Stop right away if you feel sudden pain or your pain gets worse. Do not begin these exercises until told by your health care provider.  Stretching and range-of-motion exercises  These exercises warm up your muscles and joints and improve the movement and flexibility of your foot. These exercises also help to relieve pain.  Plantar fascia stretch    1. Sit with your left / right leg crossed over your opposite knee.  2. Hold your heel with one hand with that thumb near your arch. With your other hand, hold your toes and gently pull them back toward the top of your foot. You should feel a stretch on the bottom of your toes or your foot (plantar fascia) or both.  3. Hold this stretch for__________ seconds.  4. Slowly release your toes and return to the starting position.  Repeat __________ times. Complete this exercise __________ times a day.  Gastrocnemius stretch, standing  This exercise is also called a calf (gastroc) stretch. It stretches the muscles in the back of the upper calf.  1. Stand with your hands against a wall.  2. Extend your left / right leg behind you, and bend your front knee slightly.  3. Keeping your heels on the floor and your back knee straight, shift your weight toward the wall. Do not arch your back. You should feel a gentle stretch in your upper left / right calf.  4. Hold this position for __________ seconds.  Repeat __________ times. Complete this exercise __________ times a day.  Soleus stretch, standing  This exercise is also called a calf (soleus) stretch. It stretches the muscles in the back of the lower calf.  1. Stand with your hands against a wall.  2. Extend your left / right leg behind you, and bend your front  knee slightly.  3. Keeping your heels on the floor, bend your back knee and shift your weight slightly over your back leg. You should feel a gentle stretch deep in your lower calf.  4. Hold this position for __________ seconds.  Repeat __________ times. Complete this exercise __________ times a day.  Gastroc and soleus stretch, standing step  This exercise stretches the muscles in the back of the lower leg. These muscles are in the upper calf (gastrocnemius) and the lower calf (soleus).  1. Stand with the ball of your left / right foot on a step. The ball of your foot is on the walking surface, right under your toes.  2. Keep your other foot firmly on the same step.  3. Hold on to the wall or a railing for balance.  4. Slowly lift your other foot, allowing your body weight to press your left / right heel down over the edge of the step. You should feel a stretch in your left / right calf.  5. Hold this position for __________ seconds.  6. Return both feet to the step.  7. Repeat this exercise with a slight bend in your left / right knee.  Repeat __________ times with your left / right knee straight and __________ times with your left / right knee bent. Complete this exercise __________ times a day.  Balance exercise  This exercise builds your balance and strength control of your arch to help take pressure off your plantar fascia.  Single leg stand  If this exercise is too easy, you can try it with your eyes closed or while standing on a pillow.  1. Without shoes, stand near a railing or in a doorway. You may hold on to the railing or door frame as needed.  2. Stand on your left / right foot. Keep your big toe down on the floor and try to keep your arch lifted. Do not let your foot roll inward.  3. Hold this position for __________ seconds.  Repeat __________ times. Complete this exercise __________ times a day.  This information is not intended to replace advice given to you by your health care provider. Make sure  you discuss any questions you have with your health care provider.  Document Revised: 04/09/2020 Document Reviewed: 10/16/2019  [employer] Patient Education © 2021 [employer]

## 2021-10-05 LAB — HBA1C MFR BLD: 5.8 % (ref 4.8–5.6)

## 2021-12-09 ENCOUNTER — OFFICE VISIT (OUTPATIENT)
Dept: INTERNAL MEDICINE | Facility: CLINIC | Age: 56
End: 2021-12-09

## 2021-12-09 VITALS
BODY MASS INDEX: 47.74 KG/M2 | HEIGHT: 68 IN | HEART RATE: 81 BPM | SYSTOLIC BLOOD PRESSURE: 140 MMHG | WEIGHT: 315 LBS | DIASTOLIC BLOOD PRESSURE: 70 MMHG

## 2021-12-09 DIAGNOSIS — M62.89 TENSOR FASCIA LATA SYNDROME: ICD-10-CM

## 2021-12-09 DIAGNOSIS — G89.29 CHRONIC PAIN OF BOTH KNEES: ICD-10-CM

## 2021-12-09 DIAGNOSIS — M17.10 ARTHRITIS OF KNEE: ICD-10-CM

## 2021-12-09 DIAGNOSIS — M25.561 CHRONIC PAIN OF BOTH KNEES: ICD-10-CM

## 2021-12-09 DIAGNOSIS — B36.9 FUNGAL SKIN INFECTION: ICD-10-CM

## 2021-12-09 DIAGNOSIS — M25.562 CHRONIC PAIN OF BOTH KNEES: ICD-10-CM

## 2021-12-09 DIAGNOSIS — M17.10 ARTHRITIS OF KNEE: Primary | ICD-10-CM

## 2021-12-09 DIAGNOSIS — M72.2 PLANTAR FASCIITIS OF LEFT FOOT: ICD-10-CM

## 2021-12-09 PROCEDURE — 99214 OFFICE O/P EST MOD 30 MIN: CPT | Performed by: STUDENT IN AN ORGANIZED HEALTH CARE EDUCATION/TRAINING PROGRAM

## 2021-12-09 PROCEDURE — 73560 X-RAY EXAM OF KNEE 1 OR 2: CPT | Performed by: STUDENT IN AN ORGANIZED HEALTH CARE EDUCATION/TRAINING PROGRAM

## 2021-12-09 RX ORDER — CELECOXIB 100 MG/1
CAPSULE ORAL
Qty: 180 CAPSULE | OUTPATIENT
Start: 2021-12-09

## 2021-12-09 RX ORDER — NYSTATIN 100000 [USP'U]/G
POWDER TOPICAL 3 TIMES DAILY
Qty: 15 G | Refills: 0 | Status: SHIPPED | OUTPATIENT
Start: 2021-12-09

## 2021-12-09 RX ORDER — CELECOXIB 100 MG/1
100 CAPSULE ORAL 2 TIMES DAILY PRN
Qty: 60 CAPSULE | Refills: 1 | Status: SHIPPED | OUTPATIENT
Start: 2021-12-09 | End: 2022-02-09

## 2021-12-09 NOTE — PATIENT INSTRUCTIONS
Iliotibial Bursitis Rehab  Ask your health care provider which exercises are safe for you. Do exercises exactly as told by your health care provider and adjust them as directed. It is normal to feel mild stretching, pulling, tightness, or discomfort as you do these exercises. Stop right away if you feel sudden pain or your pain gets worse. Do not begin these exercises until told by your health care provider.  Stretching and range-of-motion exercises  These exercises warm up your muscles and joints and improve the movement and flexibility of your leg. These exercises also help to relieve pain and stiffness.  Quadriceps stretch, prone    1. Lie on your abdomen (prone position) on a firm surface, such as a bed or padded floor.  2. Bend your left / right knee and reach back to hold your ankle or pant leg. If you cannot reach your ankle or pant leg, loop a belt around your foot and grab the belt instead.  3. Gently pull your heel toward your buttocks. Your knee should not slide out to the side. You should feel a stretch in the front of your thigh and knee (quadriceps).  4. Hold this position for __________ seconds.  Repeat __________ times. Complete this exercise __________ times a day.  Lunge  This exercise stretches the muscle in the inner thigh (adductor).  1. Stand and spread your legs about 3 ft (1 m) apart. Put your left / right leg slightly back for balance.  2. Lean away from your left / right leg by bending your other knee and shifting your weight toward your bent knee. You may rest your hands on your thigh for balance. You should feel a stretch in your left / right inner thigh.  3. Hold this position for __________ seconds.  Repeat __________ times. Complete this exercise __________ times a day.  Hamstring stretch, supine    1. Lie on your back (supine position).  2. Loop a belt or towel over the ball of your left / right foot. The ball of your foot is on the walking surface, right under your  toes.  3. Straighten your left / right knee and slowly pull on the belt or towel to raise your leg. Stop when you feel a gentle stretch in the back of your left / right knee or thigh (hamstrings).  ? Do not let your left / right knee bend.  ? Keep your other leg flat on the floor.  4. Hold this position for __________ seconds.  Repeat __________ times. Complete this exercise __________ times a day.  Strengthening exercises  These exercises build strength and endurance in your leg. Endurance is the ability to use your muscles for a long time, even after they get tired.  Wall slides  This exercise strengthens the muscles in the front of your thigh and knee (quadriceps).  1. Lean your back against a smooth wall or door, and walk your feet out 18-24 inches (46-61 cm) from it.  2. Place your feet hip-width apart.  3. Slowly slide down the wall or door until your knees bend as far as told by your health care provider. Keep your knees over your heels, not your toes. Keep your knees in line with your hips.  4. Hold this position for __________ seconds.  5. Use the muscles in the front of your thigh to push yourself up to the standing position.  6. Rest for __________ seconds after each repetition.  Repeat __________ times. Complete this exercise __________ times a day.  Straight leg raises, side-lying  This exercise is sometimes called a hip abductor exercise. It strengthens the muscles that rotate the leg at the hip and move it away from your body (hip abductors).  1. Lie on your side with your left / right leg in the top position. Lie so your head, shoulder, hip, and knee line up. Bend your bottom knee slightly to help you balance.  2. Lift your top leg 4-6 inches (10-15 cm) while keeping your toes pointed straight ahead.  3. Hold this position for __________ seconds.  4. Slowly lower your leg to the starting position.  5. Let your muscles relax completely after each repetition.  Repeat __________ times. Complete this  exercise __________ times a day.  Straight leg raises, prone  This exercise strengthens the muscles that move the hips (hip extensors).  1. Lie on your abdomen (prone position) on a firm surface. You can put a pillow under your hips if that is more comfortable for your lower back.  2. Squeeze your buttocks muscles and lift your left / right leg about 4-6 inches (10-15 cm). Keep your knee straight as you lift your leg.  3. Hold this position for __________ seconds.  4. Slowly lower your leg to the starting position.  5. Let your muscles relax completely after each repetition.  Repeat __________ times. Complete this exercise __________ times a day.  Bridge  This exercise strengthens the muscles that move the hips (hip extensors).  1. Lie on your back on a firm surface with your knees bent and your feet flat on the floor.  2. Tighten your buttocks muscles and lift your bottom off the floor until the trunk of your body is level with your thighs.  ? Do not arch your back.  ? You should feel the muscles working in your buttocks and the back of your thighs. If you do not feel these muscles, slide your feet 1-2 inches (2.5-5 cm) farther away from your buttocks.  3. Hold this position for __________ seconds.  4. Slowly lower your hips to the starting position.  5. Let your muscles relax completely after each repetition.  6. If this exercise is too easy, try doing it with your arms crossed over your chest.  Repeat __________ times. Complete this exercise __________ times a day.  This information is not intended to replace advice given to you by your health care provider. Make sure you discuss any questions you have with your health care provider.  Document Revised: 04/09/2020 Document Reviewed: 02/24/2020  Elsevier Patient Education © 2021 [employer]  Plantar Fasciitis Rehab  Ask your health care provider which exercises are safe for you. Do exercises exactly as told by your health care provider and adjust them as  directed. It is normal to feel mild stretching, pulling, tightness, or discomfort as you do these exercises. Stop right away if you feel sudden pain or your pain gets worse. Do not begin these exercises until told by your health care provider.  Stretching and range-of-motion exercises  These exercises warm up your muscles and joints and improve the movement and flexibility of your foot. These exercises also help to relieve pain.  Plantar fascia stretch    1. Sit with your left / right leg crossed over your opposite knee.  2. Hold your heel with one hand with that thumb near your arch. With your other hand, hold your toes and gently pull them back toward the top of your foot. You should feel a stretch on the bottom of your toes or your foot (plantar fascia) or both.  3. Hold this stretch for__________ seconds.  4. Slowly release your toes and return to the starting position.  Repeat __________ times. Complete this exercise __________ times a day.  Gastrocnemius stretch, standing  This exercise is also called a calf (gastroc) stretch. It stretches the muscles in the back of the upper calf.  1. Stand with your hands against a wall.  2. Extend your left / right leg behind you, and bend your front knee slightly.  3. Keeping your heels on the floor and your back knee straight, shift your weight toward the wall. Do not arch your back. You should feel a gentle stretch in your upper left / right calf.  4. Hold this position for __________ seconds.  Repeat __________ times. Complete this exercise __________ times a day.  Soleus stretch, standing  This exercise is also called a calf (soleus) stretch. It stretches the muscles in the back of the lower calf.  1. Stand with your hands against a wall.  2. Extend your left / right leg behind you, and bend your front knee slightly.  3. Keeping your heels on the floor, bend your back knee and shift your weight slightly over your back leg. You should feel a gentle stretch deep in your  lower calf.  4. Hold this position for __________ seconds.  Repeat __________ times. Complete this exercise __________ times a day.  Gastroc and soleus stretch, standing step  This exercise stretches the muscles in the back of the lower leg. These muscles are in the upper calf (gastrocnemius) and the lower calf (soleus).  1. Stand with the ball of your left / right foot on a step. The ball of your foot is on the walking surface, right under your toes.  2. Keep your other foot firmly on the same step.  3. Hold on to the wall or a railing for balance.  4. Slowly lift your other foot, allowing your body weight to press your left / right heel down over the edge of the step. You should feel a stretch in your left / right calf.  5. Hold this position for __________ seconds.  6. Return both feet to the step.  7. Repeat this exercise with a slight bend in your left / right knee.  Repeat __________ times with your left / right knee straight and __________ times with your left / right knee bent. Complete this exercise __________ times a day.  Balance exercise  This exercise builds your balance and strength control of your arch to help take pressure off your plantar fascia.  Single leg stand  If this exercise is too easy, you can try it with your eyes closed or while standing on a pillow.  1. Without shoes, stand near a railing or in a doorway. You may hold on to the railing or door frame as needed.  2. Stand on your left / right foot. Keep your big toe down on the floor and try to keep your arch lifted. Do not let your foot roll inward.  3. Hold this position for __________ seconds.  Repeat __________ times. Complete this exercise __________ times a day.  This information is not intended to replace advice given to you by your health care provider. Make sure you discuss any questions you have with your health care provider.  Document Revised: 04/09/2020 Document Reviewed: 10/16/2019  Elsevier Patient Education © 2021 Elsevier  Inc.

## 2021-12-09 NOTE — PROGRESS NOTES
Bhaskar Rich D.O.  Internal Medicine  [facility]  [address]  [place] [address]  [phone]      Chief Complaint  Hyperlipidemia    SUBJECTIVE    History of Present Illness    Cornelio Viera is a 56 y.o. male who presents to the office today as an established patient that last saw me on 10/4/2021.     3-4 weeks ago he felt some symptoms of his plantar fasciitis on on the inside aspect of the left foot. After 10 days it went away, but after he noticed in the last week he has felt some sharp pain that is pretty deep on the outside of his left hip, running from the top to a little more than FDC down the left side. Lying on his left side and sitting down hurts the most. He is requesting a paper for work to be seated for a few weeks while this goes away. States he had tensor fascia sintia syndrome on the right before and it felt exactly the same. No swelling or skin redness per his report.  He went to immediate care at [facility] and got steroids and muscle relaxers for this thigh pain. He states that he got really flush arms and stopped taking both of those and that subsequently resolved. Denies that the lateral thigh pain radiates into his knee.     He states that he has a rash under his left arm pit that has been going on for 2 weeks and had gotten better but subsequently got worse.    Knee arthritis: states he has never had xrays of his knees , left bothers him more than right. Diclofenac gel that I prescribed at last visit did not help as far as he could tell. He would like to go back on the celebrex that he was on before.     No Known Allergies     Outpatient Medications Marked as Taking for the 12/9/21 encounter (Office Visit) with Bhaskar Rich, DO   Medication Sig Dispense Refill   • atorvastatin (LIPITOR) 20 MG tablet Take 1 tablet by mouth Daily. 90 tablet 0   • Dolutegravir Sodium (TIVICAY PO) Take  by mouth Daily.     • Emtricitabine-Tenofovir AF (Descovy) 200-25 MG per  "tablet Take  by mouth Daily.     • oxymetazoline (AFRIN) 0.05 % nasal spray 2 sprays into each nostril 2 (Two) Times a Day.     • terbinafine (lamiSIL) 250 MG tablet Take 1 tablet by mouth Daily. 90 tablet 0   • [DISCONTINUED] Diclofenac Sodium (VOLTAREN) 1 % gel gel Apply 4 g topically to the appropriate area as directed 4 (Four) Times a Day As Needed (pain). 100 g 0        Past Medical History:   Diagnosis Date   • Arthritis     knees   • Dyspnea on exertion    • Hepatic steatosis    • HIV (human immunodeficiency virus infection) (HCC) 2004    likely contracted in 1990   • Kaposi sarcoma (HCC)    • Rapid heart rate    • Skin cancer     believes it was basal cell carcinoma    • Sleep apnea     using CPAP       OBJECTIVE    Vital Signs:   /70   Pulse 81   Ht 172.7 cm (68\")   Wt (!) 147 kg (324 lb)   BMI 49.26 kg/m²     Physical Exam  Vitals reviewed.   Constitutional:       General: He is not in acute distress.     Appearance: Normal appearance. He is obese.   Eyes:      General: No scleral icterus.  Cardiovascular:      Rate and Rhythm: Normal rate and regular rhythm.      Heart sounds: Normal heart sounds. No murmur heard.      Pulmonary:      Effort: No respiratory distress.      Breath sounds: Normal breath sounds.   Musculoskeletal:         General: No swelling.      Comments: Left hip/Thigh: normal ROM and gross strength, no overlying skin erythema or swelling. No tenderness to palpation along the IT band or greater trochanter    Skin:         Neurological:      Mental Status: He is alert.   Psychiatric:         Mood and Affect: Mood normal.         Behavior: Behavior normal.                             ASSESSMENT & PLAN     Diagnoses and all orders for this visit:    1. Arthritis of knee (Primary)  -Pt reports long standing history of bilateral knee pain, L>R. Has been told by previous providers that it was arthritis but never had imaging, will obtain bilateral knee imaging today. He reports " improvement with celebrex in the past and did not have any improvement with the diclofenac gel I prescribed at last visit. Will Rx celebrex 100 mg BID PRN for moderate pain, advised him to use with caution as NSAIDs are associated with GI bleeding and to not use other NSAIDs with celebrex. He can continue OTC tylenol. Further plan dependent upon xray results.   -     XR Knee 1 or 2 View Bilateral (In Office)  -     celecoxib (CeleBREX) 100 MG capsule; Take 1 capsule by mouth 2 (Two) Times a Day As Needed for Moderate Pain .  Dispense: 60 capsule; Refill: 1    2. Fungal skin infection  -Redness in the left axilla consistent with superficial fungal infection. Pt reports many skin tags and dries his skin perhaps incompetent due to fear of tearing them off. Encouraged pt to  Carefully and completely dry his skin under both arms multiple times daily. Will Rx empiric nystatin powder.   -     nystatin (MYCOSTATIN) 014032 UNIT/GM powder; Apply  topically to the appropriate area as directed 3 (Three) Times a Day. Apply Thin layer to underarm redness.  Dispense: 15 g; Refill: 0    3. Plantar fasciitis of left foot  -resolved now per pt report but flares up periodically. He has not been doing the exercises prescribed at last visit. Encouraged him to start those back.    4. 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 On exam there is no swelling, deformity, or tenderness to the palpation of the lateral aspect of the left thigh. He has normal gate and ROM and gross strength.   -I believe that with altered walking mechanics from his known plantar fasciitis resulted in this condition, provided general IT band strengthening and stretching exercises that he can do at home  -consider PT referral if no improvement             The following  social determinates of health impact the patient's medical decision making: No social determinates of health were factored in to today's visit.     Follow Up  Return in about 8 weeks (around 2/3/2022) for Recheck.    Patient/family had no further questions at this time and verbalized understanding of the plan discussed today.

## 2022-01-06 DIAGNOSIS — E78.5 HYPERLIPIDEMIA, UNSPECIFIED HYPERLIPIDEMIA TYPE: ICD-10-CM

## 2022-01-07 RX ORDER — ATORVASTATIN CALCIUM 20 MG/1
20 TABLET, FILM COATED ORAL DAILY
Qty: 90 TABLET | Refills: 1 | Status: SHIPPED | OUTPATIENT
Start: 2022-01-07 | End: 2022-07-08

## 2022-01-09 DIAGNOSIS — B35.1 ONYCHOMYCOSIS: ICD-10-CM

## 2022-01-10 RX ORDER — TERBINAFINE HYDROCHLORIDE 250 MG/1
250 TABLET ORAL DAILY
Qty: 90 TABLET | Refills: 0 | OUTPATIENT
Start: 2022-01-10

## 2022-01-14 ENCOUNTER — TELEPHONE (OUTPATIENT)
Dept: INTERNAL MEDICINE | Facility: CLINIC | Age: 57
End: 2022-01-14

## 2022-01-22 DIAGNOSIS — B35.1 ONYCHOMYCOSIS: ICD-10-CM

## 2022-01-24 ENCOUNTER — TELEPHONE (OUTPATIENT)
Dept: INTERNAL MEDICINE | Facility: CLINIC | Age: 57
End: 2022-01-24

## 2022-01-24 DIAGNOSIS — B35.1 ONYCHOMYCOSIS: ICD-10-CM

## 2022-01-24 RX ORDER — TERBINAFINE HYDROCHLORIDE 250 MG/1
250 TABLET ORAL DAILY
Qty: 90 TABLET | Refills: 0 | OUTPATIENT
Start: 2022-01-24

## 2022-01-24 RX ORDER — TERBINAFINE HYDROCHLORIDE 250 MG/1
250 TABLET ORAL DAILY
Qty: 30 TABLET | Refills: 0 | Status: SHIPPED | OUTPATIENT
Start: 2022-01-24

## 2022-01-24 NOTE — TELEPHONE ENCOUNTER
Rx Refill Note  Requested Prescriptions     Pending Prescriptions Disp Refills   • terbinafine (lamiSIL) 250 MG tablet [Pharmacy Med Name: TERBINAFINE 250MG TABLETS] 90 tablet 0     Sig: TAKE 1 TABLET BY MOUTH DAILY      Last office visit with prescribing clinician: 12/9/2021      Next office visit with prescribing clinician: 2/7/2022            Deandra Epstein LPN  01/24/22, 09:08 EST

## 2022-01-24 NOTE — TELEPHONE ENCOUNTER
Caller: Cornelio Viera    Relationship: Self    Best call back number: [phone]    Requested Prescriptions:   Requested Prescriptions     Pending Prescriptions Disp Refills   • terbinafine (lamiSIL) 250 MG tablet 90 tablet 0     Sig: Take 1 tablet by mouth Daily.        Pharmacy where request should be sent: [id number] - [place], [place] - [address] [facility] [place]  - [phone]  - [phone] FX     Additional details provided by patient: PATIENT STATED THAT FUNGUS ON TOES IS GETTING BETTER BUT WILL NEED TO TAKE MEDICATION A BIT LONGER TO HAVE THEM COMPLETELY HEAL.    PATIENT STATED THEY ARE STILL REALLY BRITTLE, THICKER, COLOR BETTER TOWARD THE NAIL BED, MEDICATION IS PUSHING THE FUNGUS TOWARD THE END OF THE TOES BUT IS STILL PREVALENT UNDERNEATH.    PATIENT WAS TOLD THE MEDICATION WAS DENIED AND WANTED TO KNOW WHAT STEPS HE NEEDED TO TAKE TO GET RID OF THE FUNGUS IF THE MEDICATION IS DENIED.    Does the patient have less than a 3 day supply:  [x] Yes  [] No    Aura Escoto Rep   01/24/22 10:38 EST

## 2022-02-08 DIAGNOSIS — M17.10 ARTHRITIS OF KNEE: ICD-10-CM

## 2022-02-09 DIAGNOSIS — M17.10 ARTHRITIS OF KNEE: ICD-10-CM

## 2022-02-09 RX ORDER — CELECOXIB 100 MG/1
CAPSULE ORAL
Qty: 60 CAPSULE | Refills: 1 | Status: SHIPPED | OUTPATIENT
Start: 2022-02-09 | End: 2022-04-11 | Stop reason: SDUPTHER

## 2022-02-10 RX ORDER — CELECOXIB 100 MG/1
CAPSULE ORAL
Qty: 180 CAPSULE | OUTPATIENT
Start: 2022-02-10

## 2022-03-05 DIAGNOSIS — B35.1 ONYCHOMYCOSIS: ICD-10-CM

## 2022-03-07 RX ORDER — TERBINAFINE HYDROCHLORIDE 250 MG/1
250 TABLET ORAL DAILY
Qty: 30 TABLET | Refills: 0 | OUTPATIENT
Start: 2022-03-07

## 2022-03-08 ENCOUNTER — TELEPHONE (OUTPATIENT)
Dept: INTERNAL MEDICINE | Facility: CLINIC | Age: 57
End: 2022-03-08

## 2022-03-08 ENCOUNTER — LAB (OUTPATIENT)
Dept: LAB | Facility: HOSPITAL | Age: 57
End: 2022-03-08

## 2022-03-08 ENCOUNTER — OFFICE VISIT (OUTPATIENT)
Dept: INFECTIOUS DISEASES | Facility: CLINIC | Age: 57
End: 2022-03-08

## 2022-03-08 VITALS
HEART RATE: 73 BPM | RESPIRATION RATE: 14 BRPM | DIASTOLIC BLOOD PRESSURE: 77 MMHG | HEIGHT: 68 IN | WEIGHT: 315 LBS | SYSTOLIC BLOOD PRESSURE: 122 MMHG | BODY MASS INDEX: 47.74 KG/M2 | TEMPERATURE: 97.3 F

## 2022-03-08 DIAGNOSIS — Z21 ASYMPTOMATIC HIV INFECTION: ICD-10-CM

## 2022-03-08 DIAGNOSIS — Z21 ASYMPTOMATIC HIV INFECTION: Primary | ICD-10-CM

## 2022-03-08 DIAGNOSIS — Z79.2 LONG TERM (CURRENT) USE OF ANTIBIOTICS: ICD-10-CM

## 2022-03-08 LAB
ALBUMIN SERPL-MCNC: 4.4 G/DL (ref 3.5–5.2)
ALBUMIN/GLOB SERPL: 1.3 G/DL
ALP SERPL-CCNC: 66 U/L (ref 39–117)
ALT SERPL W P-5'-P-CCNC: 94 U/L (ref 1–41)
ANION GAP SERPL CALCULATED.3IONS-SCNC: 9 MMOL/L (ref 5–15)
AST SERPL-CCNC: 81 U/L (ref 1–40)
BASOPHILS # BLD AUTO: 0.03 10*3/MM3 (ref 0–0.2)
BASOPHILS NFR BLD AUTO: 0.4 % (ref 0–1.5)
BILIRUB SERPL-MCNC: 0.7 MG/DL (ref 0–1.2)
BILIRUB UR QL STRIP: NEGATIVE
BUN SERPL-MCNC: 14 MG/DL (ref 6–20)
BUN/CREAT SERPL: 15.4 (ref 7–25)
CALCIUM SPEC-SCNC: 9.5 MG/DL (ref 8.6–10.5)
CHLORIDE SERPL-SCNC: 102 MMOL/L (ref 98–107)
CHOLEST SERPL-MCNC: 166 MG/DL (ref 0–200)
CLARITY UR: CLEAR
CO2 SERPL-SCNC: 28 MMOL/L (ref 22–29)
COLOR UR: YELLOW
CREAT SERPL-MCNC: 0.91 MG/DL (ref 0.76–1.27)
DEPRECATED RDW RBC AUTO: 42.8 FL (ref 37–54)
EGFRCR SERPLBLD CKD-EPI 2021: 98.3 ML/MIN/1.73
EOSINOPHIL # BLD AUTO: 0.18 10*3/MM3 (ref 0–0.4)
EOSINOPHIL NFR BLD AUTO: 2.4 % (ref 0.3–6.2)
ERYTHROCYTE [DISTWIDTH] IN BLOOD BY AUTOMATED COUNT: 13.4 % (ref 12.3–15.4)
GLOBULIN UR ELPH-MCNC: 3.3 GM/DL
GLUCOSE SERPL-MCNC: 128 MG/DL (ref 65–99)
GLUCOSE UR STRIP-MCNC: NEGATIVE MG/DL
HCT VFR BLD AUTO: 44.7 % (ref 37.5–51)
HDLC SERPL-MCNC: 40 MG/DL (ref 40–60)
HGB BLD-MCNC: 15.4 G/DL (ref 13–17.7)
HGB UR QL STRIP.AUTO: NEGATIVE
IMM GRANULOCYTES # BLD AUTO: 0.02 10*3/MM3 (ref 0–0.05)
IMM GRANULOCYTES NFR BLD AUTO: 0.3 % (ref 0–0.5)
KETONES UR QL STRIP: NEGATIVE
LDLC SERPL CALC-MCNC: 99 MG/DL (ref 0–100)
LDLC/HDLC SERPL: 2.37 {RATIO}
LEUKOCYTE ESTERASE UR QL STRIP.AUTO: NEGATIVE
LYMPHOCYTES # BLD AUTO: 2.88 10*3/MM3 (ref 0.7–3.1)
LYMPHOCYTES NFR BLD AUTO: 38.6 % (ref 19.6–45.3)
MCH RBC QN AUTO: 30.1 PG (ref 26.6–33)
MCHC RBC AUTO-ENTMCNC: 34.5 G/DL (ref 31.5–35.7)
MCV RBC AUTO: 87.5 FL (ref 79–97)
MONOCYTES # BLD AUTO: 0.63 10*3/MM3 (ref 0.1–0.9)
MONOCYTES NFR BLD AUTO: 8.4 % (ref 5–12)
NEUTROPHILS NFR BLD AUTO: 3.72 10*3/MM3 (ref 1.7–7)
NEUTROPHILS NFR BLD AUTO: 49.9 % (ref 42.7–76)
NITRITE UR QL STRIP: NEGATIVE
NRBC BLD AUTO-RTO: 0 /100 WBC (ref 0–0.2)
PH UR STRIP.AUTO: <=5 [PH] (ref 5–8)
PLATELET # BLD AUTO: 153 10*3/MM3 (ref 140–450)
PMV BLD AUTO: 9.8 FL (ref 6–12)
POTASSIUM SERPL-SCNC: 4.6 MMOL/L (ref 3.5–5.2)
PROT SERPL-MCNC: 7.7 G/DL (ref 6–8.5)
PROT UR QL STRIP: NEGATIVE
RBC # BLD AUTO: 5.11 10*6/MM3 (ref 4.14–5.8)
RPR SER QL: NORMAL
SODIUM SERPL-SCNC: 139 MMOL/L (ref 136–145)
SP GR UR STRIP: 1.02 (ref 1–1.03)
TRIGL SERPL-MCNC: 156 MG/DL (ref 0–150)
UROBILINOGEN UR QL STRIP: NORMAL
VLDLC SERPL-MCNC: 27 MG/DL (ref 5–40)
WBC NRBC COR # BLD: 7.46 10*3/MM3 (ref 3.4–10.8)

## 2022-03-08 PROCEDURE — 36415 COLL VENOUS BLD VENIPUNCTURE: CPT | Performed by: INTERNAL MEDICINE

## 2022-03-08 PROCEDURE — 85025 COMPLETE CBC W/AUTO DIFF WBC: CPT | Performed by: INTERNAL MEDICINE

## 2022-03-08 PROCEDURE — 80053 COMPREHEN METABOLIC PANEL: CPT | Performed by: INTERNAL MEDICINE

## 2022-03-08 PROCEDURE — 81003 URINALYSIS AUTO W/O SCOPE: CPT | Performed by: INTERNAL MEDICINE

## 2022-03-08 PROCEDURE — 80061 LIPID PANEL: CPT | Performed by: INTERNAL MEDICINE

## 2022-03-08 PROCEDURE — 86361 T CELL ABSOLUTE COUNT: CPT | Performed by: INTERNAL MEDICINE

## 2022-03-08 PROCEDURE — 86592 SYPHILIS TEST NON-TREP QUAL: CPT | Performed by: INTERNAL MEDICINE

## 2022-03-08 PROCEDURE — 99213 OFFICE O/P EST LOW 20 MIN: CPT | Performed by: INTERNAL MEDICINE

## 2022-03-08 PROCEDURE — 87536 HIV-1 QUANT&REVRSE TRNSCRPJ: CPT

## 2022-03-08 RX ORDER — ERGOCALCIFEROL (VITAMIN D2) 10 MCG
400 TABLET ORAL DAILY
COMMUNITY

## 2022-03-08 NOTE — TELEPHONE ENCOUNTER
Caller: Cornelio Viera    Relationship to patient: Self    Best call back number: [phone]    Patient is needing: PT IS CALLING IN REGARDS TO DENIAL OF terbinafine (lamiSIL) 250 MG tablet HE IS UNSURE AS TO WHY IT WAS DENIED. PLEASE CALL WHEN POSSIBLE

## 2022-03-08 NOTE — TELEPHONE ENCOUNTER
Left message for patient to call office. Spoke with patient, he states the color has improve there is still flaky build up under the nail. He would probably need another 2 months or so of the medication

## 2022-03-08 NOTE — PROGRESS NOTES
CC: f/u HIV    History of present illness:  Caleb is a 56 YOM here for f/u HIV care. He continues to tolerate the Descovy (TAF/FTC) and Tivicay (DTG) without any adverse effects. [facility]. No missed doses.     He had a blip after last visit. I asked him to repeat an HIV RNA but he did not have it done as the lab is still in order status.     He did establish with a PCP in Oct 2021. He says he's gotten some relief from his foot pain and toenail fungus.     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 For cholesterol, Disp: 90 tablet, Rfl: 1  •  celecoxib (CeleBREX) 100 MG capsule, TAKE 1 CAPSULE BY MOUTH TWICE DAILY AS NEEDED FOR MODERATE PAIN, Disp: 60 capsule, Rfl: 1  •  Dolutegravir Sodium (TIVICAY PO), Take  by mouth Daily., Disp: , Rfl:   •  Emtricitabine-Tenofovir AF (Descovy) 200-25 MG per tablet, Take  by mouth Daily., Disp: , Rfl:   •  nystatin (MYCOSTATIN) 488712 UNIT/GM powder, Apply  topically to the appropriate area as directed 3 (Three) Times a Day. Apply Thin layer to underarm redness., Disp: 15 g, Rfl: 0  •  oxymetazoline (AFRIN) 0.05 % nasal spray, 2 sprays into each nostril 2 (Two) Times a Day., Disp: , Rfl:   •  terbinafine (lamiSIL) 250 MG tablet, Take 1 tablet by mouth Daily., Disp: 30 tablet, Rfl: 0    Objective   Vital Signs   /77   Pulse 73   Temp 97.3 °F (36.3 °C)   " Resp 14   Ht 172.7 cm (68\")   Wt (!) 148 kg (326 lb)   BMI 49.57 kg/m²     Physical Exam:   General: awake, alert, very nice  Eyes: no scleral icterus  ENT: wearing mask  Cardiovascular: NR  Respiratory: normal work of breathing on RA  GI: Abdomen is obese, not distended  Skin: No rashes  Neurological: Alert and oriented x 3  Psychiatric: Normal mood and affect     Labs:   CBC, CMP, HIV labs reviewed today  Lab Results   Component Value Date    WBC 8.4 09/07/2021    WBC 8.38 09/07/2021    HGB 14.7 09/07/2021    HGB 15.0 09/07/2021    HCT 43.8 09/07/2021    HCT 44.4 09/07/2021    MCV 89 09/07/2021    MCV 88.8 09/07/2021     09/07/2021     09/07/2021     Lab Results   Component Value Date    GLUCOSE 92 09/07/2021    BUN 10 09/07/2021    CREATININE 1.04 09/07/2021    EGFRIFNONA 74 09/07/2021    BCR 9.6 09/07/2021    K 3.9 09/07/2021    CO2 26.4 09/07/2021    CALCIUM 9.3 09/07/2021    ALBUMIN 4.00 03/08/2021    AST 60 (H) 03/08/2021    ALT 87 (H) 03/08/2021     Lab Results   Component Value Date    HGBA1C 5.80 (H) 10/04/2021     Lab Results   Component Value Date     (H) 09/07/2021     UA no protein (3/2021)    HIV Labs:  CD4 - 327/9.9% (9/2021)  VL - 390 copies (9/2021)  Genotype - wild-type (12/2016)  Hep A naive (7/2017) but has now finished vaccine series as of 2018  Hep B sAb positive after vaccination (7/2019)  Hep C Ab - negative (3/2021)  Tspot negative (3/2021)  RPR non-reactive (3/2021)  Urine GC/CT NAAT: negative (3/2021)    Assessment/Plan   1. HIV  -continue Descovy (TAF/FTC)  mg and Tivicay (DTG) 50 mg PO daily through [facility]  -Labs today: CBC, CD4 count, CMP, UA, and HIV RNA    2. Morbid obesity BMI 49    3. Fatty liver disease    4. Long term use of antibiotic  -labs as above    RTC 6 months or sooner if needed.    "

## 2022-03-09 LAB
BASOPHILS # BLD AUTO: 0 X10E3/UL (ref 0–0.2)
BASOPHILS NFR BLD AUTO: 1 %
CD3+CD4+ CELLS # BLD: 305 /UL (ref 359–1519)
CD3+CD4+ CELLS NFR BLD: 10.5 % (ref 30.8–58.5)
EOSINOPHIL # BLD AUTO: 0.2 X10E3/UL (ref 0–0.4)
EOSINOPHIL NFR BLD AUTO: 2 %
ERYTHROCYTE [DISTWIDTH] IN BLOOD BY AUTOMATED COUNT: 13.5 % (ref 11.6–15.4)
HCT VFR BLD AUTO: 43.8 % (ref 37.5–51)
HGB BLD-MCNC: 15.5 G/DL (ref 13–17.7)
HIV1 RNA # SERPL NAA+PROBE: <20 COPIES/ML
HIV1 RNA SERPL NAA+PROBE-LOG#: NORMAL {LOG_COPIES}/ML
IMM GRANULOCYTES # BLD AUTO: 0 X10E3/UL (ref 0–0.1)
IMM GRANULOCYTES NFR BLD AUTO: 0 %
LYMPHOCYTES # BLD AUTO: 2.9 X10E3/UL (ref 0.7–3.1)
LYMPHOCYTES NFR BLD AUTO: 39 %
MCH RBC QN AUTO: 31.3 PG (ref 26.6–33)
MCHC RBC AUTO-ENTMCNC: 35.4 G/DL (ref 31.5–35.7)
MCV RBC AUTO: 88 FL (ref 79–97)
MONOCYTES # BLD AUTO: 0.6 X10E3/UL (ref 0.1–0.9)
MONOCYTES NFR BLD AUTO: 8 %
NEUTROPHILS # BLD AUTO: 3.7 X10E3/UL (ref 1.4–7)
NEUTROPHILS NFR BLD AUTO: 50 %
PLATELET # BLD AUTO: 160 X10E3/UL (ref 150–450)
RBC # BLD AUTO: 4.96 X10E6/UL (ref 4.14–5.8)
WBC # BLD AUTO: 7.4 X10E3/UL (ref 3.4–10.8)

## 2022-04-11 DIAGNOSIS — M17.10 ARTHRITIS OF KNEE: ICD-10-CM

## 2022-04-14 DIAGNOSIS — M17.10 ARTHRITIS OF KNEE: ICD-10-CM

## 2022-04-14 RX ORDER — CELECOXIB 100 MG/1
CAPSULE ORAL
Qty: 60 CAPSULE | Refills: 1 | Status: CANCELLED | OUTPATIENT
Start: 2022-04-14

## 2022-04-18 RX ORDER — CELECOXIB 100 MG/1
100 CAPSULE ORAL 2 TIMES DAILY PRN
Qty: 60 CAPSULE | Refills: 2 | Status: SHIPPED | OUTPATIENT
Start: 2022-04-18 | End: 2022-11-10

## 2022-07-08 DIAGNOSIS — E78.5 HYPERLIPIDEMIA, UNSPECIFIED HYPERLIPIDEMIA TYPE: ICD-10-CM

## 2022-07-08 RX ORDER — ATORVASTATIN CALCIUM 20 MG/1
20 TABLET, FILM COATED ORAL DAILY
Qty: 90 TABLET | Refills: 0 | Status: SHIPPED | OUTPATIENT
Start: 2022-07-08 | End: 2022-10-06

## 2022-08-01 ENCOUNTER — HOSPITAL ENCOUNTER (OUTPATIENT)
Dept: BONE DENSITY | Facility: HOSPITAL | Age: 57
Discharge: HOME | End: 2022-08-01
Admitting: STUDENT IN AN ORGANIZED HEALTH CARE EDUCATION/TRAINING PROGRAM

## 2022-08-01 DIAGNOSIS — B20 CURRENTLY ASYMPTOMATIC HIV INFECTION, WITH HISTORY OF HIV-RELATED ILLNESS: ICD-10-CM

## 2022-08-01 PROCEDURE — 77080 DXA BONE DENSITY AXIAL: CPT

## 2022-09-12 ENCOUNTER — LAB (OUTPATIENT)
Dept: LAB | Facility: HOSPITAL | Age: 57
End: 2022-09-12

## 2022-09-12 ENCOUNTER — OFFICE VISIT (OUTPATIENT)
Dept: INFECTIOUS DISEASES | Facility: CLINIC | Age: 57
End: 2022-09-12

## 2022-09-12 VITALS
HEART RATE: 75 BPM | WEIGHT: 315 LBS | SYSTOLIC BLOOD PRESSURE: 124 MMHG | RESPIRATION RATE: 16 BRPM | TEMPERATURE: 97.1 F | BODY MASS INDEX: 47.74 KG/M2 | HEIGHT: 68 IN | DIASTOLIC BLOOD PRESSURE: 76 MMHG

## 2022-09-12 DIAGNOSIS — K76.0 FATTY LIVER DISEASE, NONALCOHOLIC: ICD-10-CM

## 2022-09-12 DIAGNOSIS — Z21 ASYMPTOMATIC HIV INFECTION: Primary | ICD-10-CM

## 2022-09-12 DIAGNOSIS — E66.01 MORBID OBESITY: ICD-10-CM

## 2022-09-12 DIAGNOSIS — Z79.2 LONG TERM (CURRENT) USE OF ANTIBIOTICS: ICD-10-CM

## 2022-09-12 LAB
ALBUMIN SERPL-MCNC: 4.1 G/DL (ref 3.5–5.2)
ALBUMIN/GLOB SERPL: 1.3 G/DL
ALP SERPL-CCNC: 53 U/L (ref 39–117)
ALT SERPL W P-5'-P-CCNC: 133 U/L (ref 1–41)
ANION GAP SERPL CALCULATED.3IONS-SCNC: 10 MMOL/L (ref 5–15)
AST SERPL-CCNC: 106 U/L (ref 1–40)
BASOPHILS # BLD AUTO: 0.03 10*3/MM3 (ref 0–0.2)
BASOPHILS NFR BLD AUTO: 0.5 % (ref 0–1.5)
BILIRUB SERPL-MCNC: 0.6 MG/DL (ref 0–1.2)
BUN SERPL-MCNC: 11 MG/DL (ref 6–20)
BUN/CREAT SERPL: 11.6 (ref 7–25)
CALCIUM SPEC-SCNC: 9.5 MG/DL (ref 8.6–10.5)
CHLORIDE SERPL-SCNC: 103 MMOL/L (ref 98–107)
CO2 SERPL-SCNC: 25 MMOL/L (ref 22–29)
CREAT SERPL-MCNC: 0.95 MG/DL (ref 0.76–1.27)
DEPRECATED RDW RBC AUTO: 42.2 FL (ref 37–54)
EGFRCR SERPLBLD CKD-EPI 2021: 93.4 ML/MIN/1.73
EOSINOPHIL # BLD AUTO: 0.19 10*3/MM3 (ref 0–0.4)
EOSINOPHIL NFR BLD AUTO: 2.9 % (ref 0.3–6.2)
ERYTHROCYTE [DISTWIDTH] IN BLOOD BY AUTOMATED COUNT: 13.6 % (ref 12.3–15.4)
GLOBULIN UR ELPH-MCNC: 3.2 GM/DL
GLUCOSE SERPL-MCNC: 142 MG/DL (ref 65–99)
HCT VFR BLD AUTO: 41.5 % (ref 37.5–51)
HGB BLD-MCNC: 14.4 G/DL (ref 13–17.7)
IMM GRANULOCYTES # BLD AUTO: 0.01 10*3/MM3 (ref 0–0.05)
IMM GRANULOCYTES NFR BLD AUTO: 0.2 % (ref 0–0.5)
LYMPHOCYTES # BLD AUTO: 2.89 10*3/MM3 (ref 0.7–3.1)
LYMPHOCYTES NFR BLD AUTO: 44.5 % (ref 19.6–45.3)
MCH RBC QN AUTO: 29.5 PG (ref 26.6–33)
MCHC RBC AUTO-ENTMCNC: 34.7 G/DL (ref 31.5–35.7)
MCV RBC AUTO: 85 FL (ref 79–97)
MONOCYTES # BLD AUTO: 0.57 10*3/MM3 (ref 0.1–0.9)
MONOCYTES NFR BLD AUTO: 8.8 % (ref 5–12)
NEUTROPHILS NFR BLD AUTO: 2.81 10*3/MM3 (ref 1.7–7)
NEUTROPHILS NFR BLD AUTO: 43.1 % (ref 42.7–76)
NRBC BLD AUTO-RTO: 0 /100 WBC (ref 0–0.2)
PLATELET # BLD AUTO: 138 10*3/MM3 (ref 140–450)
PMV BLD AUTO: 9.5 FL (ref 6–12)
POTASSIUM SERPL-SCNC: 4.2 MMOL/L (ref 3.5–5.2)
PROT SERPL-MCNC: 7.3 G/DL (ref 6–8.5)
RBC # BLD AUTO: 4.88 10*6/MM3 (ref 4.14–5.8)
SODIUM SERPL-SCNC: 138 MMOL/L (ref 136–145)
WBC NRBC COR # BLD: 6.5 10*3/MM3 (ref 3.4–10.8)

## 2022-09-12 PROCEDURE — 36415 COLL VENOUS BLD VENIPUNCTURE: CPT | Performed by: INTERNAL MEDICINE

## 2022-09-12 PROCEDURE — 80053 COMPREHEN METABOLIC PANEL: CPT | Performed by: INTERNAL MEDICINE

## 2022-09-12 PROCEDURE — 99214 OFFICE O/P EST MOD 30 MIN: CPT | Performed by: INTERNAL MEDICINE

## 2022-09-12 PROCEDURE — 87536 HIV-1 QUANT&REVRSE TRNSCRPJ: CPT | Performed by: INTERNAL MEDICINE

## 2022-09-12 PROCEDURE — 86361 T CELL ABSOLUTE COUNT: CPT | Performed by: INTERNAL MEDICINE

## 2022-09-12 PROCEDURE — 85025 COMPLETE CBC W/AUTO DIFF WBC: CPT | Performed by: INTERNAL MEDICINE

## 2022-09-12 NOTE — PROGRESS NOTES
ID CLINIC NOTE    CC: f/u HIV    History of present illness:  Caleb is a 56 y.o. male here for f/u HIV care. He continues to tolerate the Descovy (TAF/FTC) and Tivicay (DTG) without any adverse effects. Refills through  [facility]. No missed doses.     Terbinafine was stopped by his PCP due to elevated liver enzymes.     Weight is down 6# since last visit.     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 For cholesterol. Patient will need appointment before additional refills., Disp: 90 tablet, Rfl: 0  •  celecoxib (CeleBREX) 100 MG capsule, Take 1 capsule by mouth 2 (Two) Times a Day As Needed for Moderate Pain ., Disp: 60 capsule, Rfl: 2  •  Cyanocobalamin (VITAMIN B 12 PO), Take 500 mg by mouth Daily., Disp: , Rfl:   •  Dolutegravir Sodium (TIVICAY PO), Take  by mouth Daily., Disp: , Rfl:   •  Emtricitabine-Tenofovir AF (Descovy) 200-25 MG per tablet, Take  by mouth Daily., Disp: , Rfl:   •  nystatin (MYCOSTATIN) 481902 UNIT/GM powder, Apply  topically to the appropriate area as directed 3 (Three) Times a Day. Apply Thin layer to underarm redness., Disp: 15 g, Rfl: 0  •  oxymetazoline (AFRIN) 0.05 % nasal spray, 2 sprays into each nostril 2 (Two) Times a Day., Disp: , Rfl:   •  terbinafine (lamiSIL) 250 MG tablet, Take 1 tablet by mouth Daily., Disp: 30 tablet, Rfl: 0  •  Vitamin D, Cholecalciferol,  "(CHOLECALCIFEROL) 10 MCG (400 UNIT) tablet, Take 400 Units by mouth Daily., Disp: , Rfl:     Objective   Vital Signs   Temp 97.1 °F (36.2 °C)   Resp 16   Ht 172.7 cm (67.99\")   Wt (!) 146 kg (320 lb 12.8 oz)   BMI 48.79 kg/m²     Physical Exam:   General: awake, alert, very nice  Eyes: no scleral icterus  ENT: wearing mask  Cardiovascular: NR  Respiratory: normal work of breathing on RA  GI: Abdomen is obese, not distended, not tender  Skin: No rashes  Neurological: Alert and oriented x 3  Psychiatric: Normal mood and affect     Labs:   CBC, CMP, HIV labs reviewed today  Lab Results   Component Value Date    WBC 7.4 03/08/2022    WBC 7.46 03/08/2022    HGB 15.5 03/08/2022    HGB 15.4 03/08/2022    HCT 43.8 03/08/2022    HCT 44.7 03/08/2022    MCV 88 03/08/2022    MCV 87.5 03/08/2022     03/08/2022     03/08/2022     Lab Results   Component Value Date    GLUCOSE 128 (H) 03/08/2022    BUN 14 03/08/2022    CREATININE 0.91 03/08/2022    EGFRIFNONA 74 09/07/2021    BCR 15.4 03/08/2022    K 4.6 03/08/2022    CO2 28.0 03/08/2022    CALCIUM 9.5 03/08/2022    ALBUMIN 4.40 03/08/2022    AST 81 (H) 03/08/2022    ALT 94 (H) 03/08/2022     Lab Results   Component Value Date    HGBA1C 5.80 (H) 10/04/2021     Lab Results   Component Value Date    LDL 99 03/08/2022     UA no protein (3/2022)    HIV Labs:  CD4 - 305/10% (3/2022)  VL - < 20 copies (3/2022)  Genotype - wild-type (12/2016)  Hep A naive (7/2017) but has now finished vaccine series as of 2018  Hep B sAb positive after vaccination (7/2019)  Hep C Ab - negative (3/2021)  Tspot negative (3/2021)  RPR non-reactive (3/2022)  Urine GC/CT NAAT: negative (3/2021)    Assessment & Plan   1. HIV  -continue Descovy (TAF/FTC)  mg and Tivicay (DTG) 50 mg PO daily through [facility]  -Labs today: CBC, CMP, HIV RNA (viral load), and CD4 count    2. Morbid obesity BMI 48    3. Fatty liver disease    4. Long term use of antibiotic  -labs as above    RTC 6 months " or sooner if needed.

## 2022-09-13 LAB
BASOPHILS # BLD AUTO: 0 X10E3/UL (ref 0–0.2)
BASOPHILS NFR BLD AUTO: 1 %
CD3+CD4+ CELLS # BLD: 267 /UL (ref 359–1519)
CD3+CD4+ CELLS NFR BLD: 9.2 % (ref 30.8–58.5)
EOSINOPHIL # BLD AUTO: 0.2 X10E3/UL (ref 0–0.4)
EOSINOPHIL NFR BLD AUTO: 3 %
ERYTHROCYTE [DISTWIDTH] IN BLOOD BY AUTOMATED COUNT: 13.8 % (ref 11.6–15.4)
HCT VFR BLD AUTO: 42.7 % (ref 37.5–51)
HGB BLD-MCNC: 14.5 G/DL (ref 13–17.7)
HIV1 RNA # SERPL NAA+PROBE: 1800 COPIES/ML
HIV1 RNA SERPL NAA+PROBE-LOG#: 3.25 LOG10COPY/ML
IMM GRANULOCYTES # BLD AUTO: 0 X10E3/UL (ref 0–0.1)
IMM GRANULOCYTES NFR BLD AUTO: 0 %
LYMPHOCYTES # BLD AUTO: 2.9 X10E3/UL (ref 0.7–3.1)
LYMPHOCYTES NFR BLD AUTO: 45 %
MCH RBC QN AUTO: 30 PG (ref 26.6–33)
MCHC RBC AUTO-ENTMCNC: 34 G/DL (ref 31.5–35.7)
MCV RBC AUTO: 88 FL (ref 79–97)
MONOCYTES # BLD AUTO: 0.5 X10E3/UL (ref 0.1–0.9)
MONOCYTES NFR BLD AUTO: 8 %
NEUTROPHILS # BLD AUTO: 2.8 X10E3/UL (ref 1.4–7)
NEUTROPHILS NFR BLD AUTO: 43 %
PLATELET # BLD AUTO: 145 X10E3/UL (ref 150–450)
RBC # BLD AUTO: 4.84 X10E6/UL (ref 4.14–5.8)
WBC # BLD AUTO: 6.4 X10E3/UL (ref 3.4–10.8)

## 2022-09-20 DIAGNOSIS — Z21 ASYMPTOMATIC HIV INFECTION: Primary | ICD-10-CM

## 2022-09-26 ENCOUNTER — TELEPHONE (OUTPATIENT)
Dept: INFECTIOUS DISEASES | Facility: CLINIC | Age: 57
End: 2022-09-26

## 2022-09-26 NOTE — TELEPHONE ENCOUNTER
I spoke w/ patient to remind him about having the labs drawn that Dr. Holm had ordered. He said that he was planning on having the labs done tomorrow.

## 2022-09-27 ENCOUNTER — TELEPHONE (OUTPATIENT)
Dept: INFECTIOUS DISEASES | Facility: CLINIC | Age: 57
End: 2022-09-27

## 2022-09-27 NOTE — TELEPHONE ENCOUNTER
Received call from patient stating that unfortunately he will not be able to get to the lab work done that Dr. Holm had ordered for at least a couple of weeks since his car had to be taken to the shop.

## 2022-10-03 ENCOUNTER — LAB (OUTPATIENT)
Dept: LAB | Facility: HOSPITAL | Age: 57
End: 2022-10-03

## 2022-10-03 DIAGNOSIS — Z21 ASYMPTOMATIC HIV INFECTION: ICD-10-CM

## 2022-10-03 PROCEDURE — 36415 COLL VENOUS BLD VENIPUNCTURE: CPT

## 2022-10-03 PROCEDURE — 87536 HIV-1 QUANT&REVRSE TRNSCRPJ: CPT

## 2022-10-04 LAB
HIV1 RNA # SERPL NAA+PROBE: 30 COPIES/ML
HIV1 RNA SERPL NAA+PROBE-LOG#: 1.48 LOG10COPY/ML

## 2022-10-05 ENCOUNTER — TELEPHONE (OUTPATIENT)
Dept: INFECTIOUS DISEASES | Facility: CLINIC | Age: 57
End: 2022-10-05

## 2022-10-05 NOTE — TELEPHONE ENCOUNTER
Patient notified viral load normal and thanks for coming in for repeat labs. Follow up as scheduled and continue current meds. DOROTHY, RN

## 2022-10-05 NOTE — TELEPHONE ENCOUNTER
"----- Message from Andrea Holm MD sent at 10/5/2022  7:47 AM EDT -----  Please let patient know his viral load came back to normal. And tell him \"thank you\" for coming in to get the lab done.   "

## 2022-10-06 DIAGNOSIS — E78.5 HYPERLIPIDEMIA, UNSPECIFIED HYPERLIPIDEMIA TYPE: ICD-10-CM

## 2022-10-06 LAB
GENE ANALYSIS NARR RPT DOC: NORMAL
HIV 1 RNA RT + PR + IN MUT DET PLAS SEQ: NORMAL
SPECIMEN CONDITION: NORMAL

## 2022-10-06 RX ORDER — DOLUTEGRAVIR SODIUM 50 MG/1
TABLET, FILM COATED ORAL
Qty: 30 TABLET | Refills: 11 | Status: SHIPPED | OUTPATIENT
Start: 2022-10-06

## 2022-10-06 RX ORDER — EMTRICITABINE AND TENOFOVIR ALAFENAMIDE 200; 25 MG/1; MG/1
1 TABLET ORAL DAILY
Qty: 30 TABLET | Refills: 11 | Status: SHIPPED | OUTPATIENT
Start: 2022-10-06 | End: 2022-11-05

## 2022-10-06 RX ORDER — ATORVASTATIN CALCIUM 20 MG/1
TABLET, FILM COATED ORAL
Qty: 90 TABLET | Refills: 1 | Status: SHIPPED | OUTPATIENT
Start: 2022-10-06

## 2022-11-05 DIAGNOSIS — M17.10 ARTHRITIS OF KNEE: ICD-10-CM

## 2022-11-10 RX ORDER — CELECOXIB 100 MG/1
100 CAPSULE ORAL DAILY PRN
Qty: 60 CAPSULE | Refills: 0 | Status: SHIPPED | OUTPATIENT
Start: 2022-11-10

## 2023-01-10 ENCOUNTER — TELEPHONE (OUTPATIENT)
Dept: INFECTIOUS DISEASES | Facility: CLINIC | Age: 58
End: 2023-01-10
Payer: COMMERCIAL

## 2023-01-10 NOTE — TELEPHONE ENCOUNTER
Patient calling to request refill on Vitamin D. I told him that would normally go through his PCP but he states our office has always filled it? If so, it's Vitamin D 400iu po qd. Arnel is listed in Epic . DOROTHY, RN

## 2023-01-10 NOTE — TELEPHONE ENCOUNTER
Patient notified via his voicemail, per Dr. Holm's attached note, that he needs to contact PCP for refill on his Vitamin D or can purchase OTC. DOROTHY, RN

## 2023-02-08 DIAGNOSIS — M17.10 ARTHRITIS OF KNEE: ICD-10-CM

## 2023-02-09 RX ORDER — CELECOXIB 100 MG/1
CAPSULE ORAL
Qty: 60 CAPSULE | Refills: 0 | OUTPATIENT
Start: 2023-02-09

## 2023-04-04 DIAGNOSIS — E78.5 HYPERLIPIDEMIA, UNSPECIFIED HYPERLIPIDEMIA TYPE: ICD-10-CM

## 2023-04-04 RX ORDER — ATORVASTATIN CALCIUM 20 MG/1
TABLET, FILM COATED ORAL
Qty: 90 TABLET | Refills: 1 | OUTPATIENT
Start: 2023-04-04

## 2023-05-15 ENCOUNTER — LAB (OUTPATIENT)
Dept: LAB | Facility: HOSPITAL | Age: 58
End: 2023-05-15
Payer: COMMERCIAL

## 2023-05-15 ENCOUNTER — OFFICE VISIT (OUTPATIENT)
Dept: INFECTIOUS DISEASES | Facility: CLINIC | Age: 58
End: 2023-05-15
Payer: COMMERCIAL

## 2023-05-15 VITALS
DIASTOLIC BLOOD PRESSURE: 71 MMHG | TEMPERATURE: 97.1 F | SYSTOLIC BLOOD PRESSURE: 116 MMHG | HEART RATE: 71 BPM | RESPIRATION RATE: 20 BRPM | BODY MASS INDEX: 44.53 KG/M2 | WEIGHT: 292.8 LBS

## 2023-05-15 DIAGNOSIS — Z21 ASYMPTOMATIC HIV INFECTION: Primary | ICD-10-CM

## 2023-05-15 LAB
ALBUMIN SERPL-MCNC: 4.4 G/DL (ref 3.5–5.2)
ALBUMIN/GLOB SERPL: 1.6 G/DL
ALP SERPL-CCNC: 59 U/L (ref 39–117)
ALT SERPL W P-5'-P-CCNC: 41 U/L (ref 1–41)
ANION GAP SERPL CALCULATED.3IONS-SCNC: 10.3 MMOL/L (ref 5–15)
AST SERPL-CCNC: 32 U/L (ref 1–40)
BASOPHILS # BLD AUTO: 0.02 10*3/MM3 (ref 0–0.2)
BASOPHILS NFR BLD AUTO: 0.3 % (ref 0–1.5)
BILIRUB SERPL-MCNC: 0.6 MG/DL (ref 0–1.2)
BUN SERPL-MCNC: 14 MG/DL (ref 6–20)
BUN/CREAT SERPL: 13.9 (ref 7–25)
CALCIUM SPEC-SCNC: 9.8 MG/DL (ref 8.6–10.5)
CHLORIDE SERPL-SCNC: 104 MMOL/L (ref 98–107)
CO2 SERPL-SCNC: 26.7 MMOL/L (ref 22–29)
CREAT SERPL-MCNC: 1.01 MG/DL (ref 0.76–1.27)
DEPRECATED RDW RBC AUTO: 44.2 FL (ref 37–54)
EGFRCR SERPLBLD CKD-EPI 2021: 86.2 ML/MIN/1.73
EOSINOPHIL # BLD AUTO: 0.2 10*3/MM3 (ref 0–0.4)
EOSINOPHIL NFR BLD AUTO: 3 % (ref 0.3–6.2)
ERYTHROCYTE [DISTWIDTH] IN BLOOD BY AUTOMATED COUNT: 13.9 % (ref 12.3–15.4)
GLOBULIN UR ELPH-MCNC: 2.8 GM/DL
GLUCOSE SERPL-MCNC: 122 MG/DL (ref 65–99)
HCT VFR BLD AUTO: 43.5 % (ref 37.5–51)
HGB BLD-MCNC: 15.3 G/DL (ref 13–17.7)
LYMPHOCYTES # BLD AUTO: 3.04 10*3/MM3 (ref 0.7–3.1)
LYMPHOCYTES NFR BLD AUTO: 45.9 % (ref 19.6–45.3)
MCH RBC QN AUTO: 31 PG (ref 26.6–33)
MCHC RBC AUTO-ENTMCNC: 35.2 G/DL (ref 31.5–35.7)
MCV RBC AUTO: 88.1 FL (ref 79–97)
MONOCYTES # BLD AUTO: 0.42 10*3/MM3 (ref 0.1–0.9)
MONOCYTES NFR BLD AUTO: 6.3 % (ref 5–12)
NEUTROPHILS NFR BLD AUTO: 2.93 10*3/MM3 (ref 1.7–7)
NEUTROPHILS NFR BLD AUTO: 44.3 % (ref 42.7–76)
PLATELET # BLD AUTO: 137 10*3/MM3 (ref 140–450)
PMV BLD AUTO: 10.3 FL (ref 6–12)
POTASSIUM SERPL-SCNC: 4.5 MMOL/L (ref 3.5–5.2)
PROT SERPL-MCNC: 7.2 G/DL (ref 6–8.5)
RBC # BLD AUTO: 4.94 10*6/MM3 (ref 4.14–5.8)
SODIUM SERPL-SCNC: 141 MMOL/L (ref 136–145)
WBC NRBC COR # BLD: 6.62 10*3/MM3 (ref 3.4–10.8)

## 2023-05-15 PROCEDURE — 85025 COMPLETE CBC W/AUTO DIFF WBC: CPT | Performed by: INTERNAL MEDICINE

## 2023-05-15 PROCEDURE — 86361 T CELL ABSOLUTE COUNT: CPT | Performed by: INTERNAL MEDICINE

## 2023-05-15 PROCEDURE — 99214 OFFICE O/P EST MOD 30 MIN: CPT | Performed by: INTERNAL MEDICINE

## 2023-05-15 PROCEDURE — 87536 HIV-1 QUANT&REVRSE TRNSCRPJ: CPT | Performed by: INTERNAL MEDICINE

## 2023-05-15 PROCEDURE — 36415 COLL VENOUS BLD VENIPUNCTURE: CPT | Performed by: INTERNAL MEDICINE

## 2023-05-15 PROCEDURE — 80053 COMPREHEN METABOLIC PANEL: CPT | Performed by: INTERNAL MEDICINE

## 2023-05-15 RX ORDER — EMTRICITABINE AND TENOFOVIR ALAFENAMIDE 200; 25 MG/1; MG/1
1 TABLET ORAL DAILY
Qty: 30 TABLET | Refills: 11 | Status: SHIPPED | OUTPATIENT
Start: 2023-05-15 | End: 2023-06-14

## 2023-05-15 NOTE — PROGRESS NOTES
ID CLINIC NOTE    CC: f/u HIV    History of present illness:  Caleb is a 57 y.o. male here for f/u HIV care. He continues to tolerate the Descovy (TAF/FTC) and Tivicay (DTG) without any adverse effects. Refills through  [facility]. He's had some issues w/ the pharmacy delivering his medicines so he has missed the past few days. He is expecting a shipment very soon. We are going to change to [facility].    After last visit, he had labs done that showed a VL of 1800 copies. I had him come back to the lab and the repeat was down to just 30 copies.     He's lost 30-40# since last visit.    PMH:  HIV/AIDS on ART  Kaposi's sarcoma s/p chemotherapy many years ago  Melanoma s/p removal  Hematochezia  Osteoarthritis  MIAN w/ CPAP  Cellulitis  Morbid obesity     Past Surgical History:   Procedure Laterality Date   • TOENAIL EXCISION      multiple from infections   • WISDOM TOOTH EXTRACTION  1983     Social History:  No tob/EtOH/illicits  Works in [employer] store  Pet dogs at home  Not sexually active at this time    Family History:  Mom: breast cancer  Dad: MI    Allergies:  NKDA    Medications:    Current Outpatient Medications:   •  atorvastatin (LIPITOR) 20 MG tablet, TAKE 1 TABLET BY MOUTH DAILY FOR CHOLESTEROL, Disp: 90 tablet, Rfl: 1  •  celecoxib (CeleBREX) 100 MG capsule, Take 1 capsule by mouth Daily As Needed for Moderate Pain. Need annual physical scheduled for additional refills., Disp: 60 capsule, Rfl: 0  •  Cyanocobalamin (VITAMIN B 12 PO), Take 500 mg by mouth Daily., Disp: , Rfl:   •  Dolutegravir Sodium (TIVICAY PO), Take  by mouth Daily., Disp: , Rfl:   •  nystatin (MYCOSTATIN) 477006 UNIT/GM powder, Apply  topically to the appropriate area as directed 3 (Three) Times a Day. Apply Thin layer to underarm redness., Disp: 15 g, Rfl: 0  •  oxymetazoline (AFRIN) 0.05 % nasal spray, 2 sprays into each nostril 2 (Two) Times a Day., Disp: , Rfl:   •  terbinafine (lamiSIL) 250 MG tablet, Take  1 tablet by mouth Daily., Disp: 30 tablet, Rfl: 0  •  Tivicay 50 MG tablet, TAKE 1 TABLET BY MOUTH DAILY, Disp: 30 tablet, Rfl: 11  •  Vitamin D, Cholecalciferol, (CHOLECALCIFEROL) 10 MCG (400 UNIT) tablet, Take 400 Units by mouth Daily., Disp: , Rfl:     Objective   Vital Signs   /71   Pulse 71   Temp 97.1 °F (36.2 °C)   Resp 20   Wt 133 kg (292 lb 12.8 oz)   BMI 44.53 kg/m²     Physical Exam:   General: awake, alert, very nice  Eyes: no scleral icterus  ENT: no thrush  Cardiovascular: NR, no murmur  Respiratory: normal work of breathing on room air; no rales or wheezing  GI: Abdomen is obese, not distended, not tender  Skin: No rashes  Neurological: Alert and oriented x 3  Psychiatric: Normal mood and affect     Labs:   CBC, CMP, HIV labs reviewed today  Lab Results   Component Value Date    WBC 6.4 09/12/2022    WBC 6.50 09/12/2022    HGB 14.5 09/12/2022    HGB 14.4 09/12/2022    HCT 42.7 09/12/2022    HCT 41.5 09/12/2022    MCV 88 09/12/2022    MCV 85.0 09/12/2022     (L) 09/12/2022     (L) 09/12/2022     Lab Results   Component Value Date    GLUCOSE 142 (H) 09/12/2022    BUN 11 09/12/2022    CREATININE 0.95 09/12/2022    EGFRIFNONA 74 09/07/2021    BCR 11.6 09/12/2022    K 4.2 09/12/2022    CO2 25.0 09/12/2022    CALCIUM 9.5 09/12/2022    ALBUMIN 4.10 09/12/2022     (H) 09/12/2022     (H) 09/12/2022     Lab Results   Component Value Date    HGBA1C 5.80 (H) 10/04/2021     Lab Results   Component Value Date    LDL 99 03/08/2022     UA no protein (3/2022)    HIV Labs:  CD4 - 305/10% (3/2022)  VL - 30 copies (10/2022)  Genotype - wild-type (12/2016)  Hep A naive (7/2017) but finished vaccine series in 2018  Hep B sAb positive after vaccination (7/2019)  Hep C Ab - negative (3/2021)  Tspot negative (3/2021)  RPR non-reactive (3/2022)  Urine GC/CT NAAT: negative (3/2021)    Assessment & Plan   1. HIV  -continue Descovy (TAF/FTC)  mg and Tivicay (DTG) 50 mg PO daily  -we  are going to try to change him from [facility] to [facility] due to delivery issues  -Labs today: CBC, CMP, HIV RNA (viral load), and CD4 count    2. Morbid obesity BMI 44  -doing great w/ his weight loss    3. Fatty liver disease    4. Long term use of antibiotic  -labs as above    RTC 6 months or sooner if needed.

## 2023-05-16 ENCOUNTER — TELEPHONE (OUTPATIENT)
Dept: INFECTIOUS DISEASES | Facility: CLINIC | Age: 58
End: 2023-05-16
Payer: COMMERCIAL

## 2023-05-16 LAB
BASOPHILS # BLD AUTO: 0 X10E3/UL (ref 0–0.2)
BASOPHILS NFR BLD AUTO: 1 %
CD3+CD4+ CELLS # BLD: 224 /UL (ref 359–1519)
CD3+CD4+ CELLS NFR BLD: 8 % (ref 30.8–58.5)
EOSINOPHIL # BLD AUTO: 0.2 X10E3/UL (ref 0–0.4)
EOSINOPHIL NFR BLD AUTO: 3 %
ERYTHROCYTE [DISTWIDTH] IN BLOOD BY AUTOMATED COUNT: 13.9 % (ref 11.6–15.4)
HCT VFR BLD AUTO: 44.8 % (ref 37.5–51)
HGB BLD-MCNC: 15.4 G/DL (ref 13–17.7)
HIV1 RNA # SERPL NAA+PROBE: <20 COPIES/ML
HIV1 RNA SERPL NAA+PROBE-LOG#: NORMAL LOG10COPY/ML
IMM GRANULOCYTES # BLD AUTO: 0 X10E3/UL (ref 0–0.1)
IMM GRANULOCYTES NFR BLD AUTO: 0 %
LYMPHOCYTES # BLD AUTO: 2.8 X10E3/UL (ref 0.7–3.1)
LYMPHOCYTES NFR BLD AUTO: 44 %
MCH RBC QN AUTO: 30.2 PG (ref 26.6–33)
MCHC RBC AUTO-ENTMCNC: 34.4 G/DL (ref 31.5–35.7)
MCV RBC AUTO: 88 FL (ref 79–97)
MONOCYTES # BLD AUTO: 0.4 X10E3/UL (ref 0.1–0.9)
MONOCYTES NFR BLD AUTO: 7 %
NEUTROPHILS # BLD AUTO: 2.8 X10E3/UL (ref 1.4–7)
NEUTROPHILS NFR BLD AUTO: 45 %
PLATELET # BLD AUTO: 140 X10E3/UL (ref 150–450)
RBC # BLD AUTO: 5.1 X10E6/UL (ref 4.14–5.8)
WBC # BLD AUTO: 6.3 X10E3/UL (ref 3.4–10.8)

## 2023-05-16 NOTE — TELEPHONE ENCOUNTER
5/16/2023 Patient stated that he has been receiving his Descovy & Tivicay meds from [facility] but he told Dr. Holm that there was transportation issues and that Dr. Holm recommended that patient get these meds from [facility] since he could possibly get it at no cost but would need to verify w/ Arnel as the actual cost. He said that [facility] told him that they would need to run the prescriptions thru his insurance in order to get a cost that patient could possibly owe but that it appeared that this med was showing that it had just been filled at patient's pharmacy ([facility]) and so they could not let him know the cost at that time. Patient voiced concern that he is not sent these 2 medicines by [facility] and then sent a bill because he does not want to agree until he knows and agrees to a cost.  I then called [facility] and spoke w/ Noelle. She said that [facility] on 6/4/2023 and would be able to tell patient at that time the cost to him to have [facility] provide these 2 medicines. She said that for the first time use that they would have to verify w/ patient that he would want to use them and agree to the cost that he would have to pay, and they would have to obtain the money before they would send the medicines to him. She said that they will run the cost of these meds on 6/4/2023 & contact the patient.   I then called patient back to inform him. I informed him that he would need to call [facility] on 6/5/2023 and talk w/ them about the insurance coverage and whether he would like to use [facility] and they pay them before they would send the med. He said he would call [facility] on 6/5/2023 to find out the coverage and that he does not want to cancel the [facility] for these meds yet until he is sure that he wants to use [facility]. I told him that I would send a message to   Mihai.    5/17/2023 Received reply from Dr. Holm for me to speak w/ pharmacist Sadiq who has been working w/ Dr. Holm regarding these medications.  Per  pharmacist Sadiq the patient's meds on the [facility] may need to be obtained solely through the pharmacy [facility] pharmacy that he has been obtaining it through in order for it to be covered in free with no cost to him.  I notified patient of this and told him that the [facility] pharmacy would be contacting him 6/5/23 to inform him of any cost if he would like them to provide the 2 meds to him .  Patient told me that he had been dissatisfied w/ the current pharmacy as there seemed to be a delay in his receiving the med. I told him that I have spoken with Stefanie at [facility] pharmacy to see if there was a way that patient could receive his meds without delay. She said she would place these meds on an automatic dispense but that they would need to contact patient before actually sending out the med to be delivered to him to verify when & where they will be sending it to.   I called patient back to inform him that he would need to promptly return any calls he might receive from [facility] pharmacy to prevent any delays. He stated understanding. Patient confirmed that he had received the delivery of the Descovy & Tivicay meds from [facility] pharmacy earlier this week. He said that he will call the [facility] pharmacy on 6/5/23 if he had not heard from them midday to verify if the meds would possibly be covered in full with no cost to him and if so then he would like to start obtaining these meds there.

## 2023-06-06 ENCOUNTER — TELEPHONE (OUTPATIENT)
Dept: INFECTIOUS DISEASES | Facility: CLINIC | Age: 58
End: 2023-06-06
Payer: COMMERCIAL

## 2023-06-06 NOTE — TELEPHONE ENCOUNTER
Late entry. On 6/5/2023 I received message from co-worker Doreen that patient called to let me know that he has decided to continue to get his meds- Descovy & Tivicay from pharmacy [facility] since the cost to obtain these meds from [facility] would be approximately $250.

## 2023-11-27 ENCOUNTER — OFFICE VISIT (OUTPATIENT)
Dept: INFECTIOUS DISEASES | Facility: CLINIC | Age: 58
End: 2023-11-27
Payer: COMMERCIAL

## 2023-11-27 VITALS
DIASTOLIC BLOOD PRESSURE: 79 MMHG | HEIGHT: 68 IN | HEART RATE: 75 BPM | TEMPERATURE: 98.5 F | BODY MASS INDEX: 45.28 KG/M2 | RESPIRATION RATE: 18 BRPM | SYSTOLIC BLOOD PRESSURE: 136 MMHG | WEIGHT: 298.8 LBS

## 2023-11-27 DIAGNOSIS — K76.0 FATTY LIVER DISEASE, NONALCOHOLIC: ICD-10-CM

## 2023-11-27 DIAGNOSIS — Z21 ASYMPTOMATIC HIV INFECTION: Primary | ICD-10-CM

## 2023-11-27 DIAGNOSIS — Z79.2 LONG TERM (CURRENT) USE OF ANTIBIOTICS: ICD-10-CM

## 2023-11-27 DIAGNOSIS — E66.01 MORBID OBESITY: ICD-10-CM

## 2023-11-27 RX ORDER — EMTRICITABINE AND TENOFOVIR ALAFENAMIDE 200; 25 MG/1; MG/1
1 TABLET ORAL DAILY
COMMUNITY
Start: 2023-11-26

## 2023-11-27 RX ORDER — DOLUTEGRAVIR SODIUM 50 MG/1
50 TABLET, FILM COATED ORAL DAILY
COMMUNITY
Start: 2023-11-26

## 2023-11-27 NOTE — PROGRESS NOTES
"ID CLINIC NOTE    CC: f/u HIV    Subj:  Caleb is a 58 y.o. male here for f/u HIV care. He continues to tolerate the Descovy (TAF/FTC) and Tivicay (DTG) without any adverse effects. Refills through  [facility]ty Pharmacy. No cost to him.     His weight is up about 6# from last visit but still down compared to a year ago. He feels like he has hit a plateau.     No other new health issues.     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  Not sexually active at this time    Family History:  Mom: breast cancer  Dad: MI    Allergies:  NKDA    Medications:    Current Outpatient Medications:     Cyanocobalamin (VITAMIN B 12 PO), Take 500 mg by mouth Daily., Disp: , Rfl:     Descovy 200-25 MG per tablet, Take 1 tablet by mouth Daily., Disp: , Rfl:     oxymetazoline (AFRIN) 0.05 % nasal spray, 2 sprays into the nostril(s) as directed by provider 2 (Two) Times a Day., Disp: , Rfl:     Tivicay 50 MG tablet, Take 1 tablet by mouth Daily., Disp: , Rfl:     Vitamin D, Cholecalciferol, (CHOLECALCIFEROL) 10 MCG (400 UNIT) tablet, Take 1 tablet by mouth Daily., Disp: , Rfl:       Objective   Vital Signs   /79 (BP Location: Right arm, Patient Position: Sitting, Cuff Size: Large Adult)   Pulse 75   Temp 98.5 °F (36.9 °C) (Oral)   Resp 18   Ht 172.7 cm (68\")   Wt 136 kg (298 lb 12.8 oz)   BMI 45.43 kg/m²     Physical Exam:   General: awake, alert, very nice  Eyes: no scleral icterus  Cardiovascular: NR  Respiratory: normal work of breathing on RA  GI: Abdomen is obese, not distended, not tender  Skin: No rashes  Neurological: Alert and oriented x 3  Psychiatric: Normal mood and affect "     Labs:   CBC, CMP, CD4 count, HIV RNA reviewed today  Lab Results   Component Value Date    WBC 6.3 05/15/2023    WBC 6.62 05/15/2023    HGB 15.4 05/15/2023    HGB 15.3 05/15/2023    HCT 44.8 05/15/2023    HCT 43.5 05/15/2023    MCV 88 05/15/2023    MCV 88.1 05/15/2023     (L) 05/15/2023     (L) 05/15/2023     Lab Results   Component Value Date    GLUCOSE 122 (H) 05/15/2023    BUN 14 05/15/2023    CREATININE 1.01 05/15/2023    EGFRIFNONA 74 09/07/2021    BCR 13.9 05/15/2023    K 4.5 05/15/2023    CO2 26.7 05/15/2023    CALCIUM 9.8 05/15/2023    ALBUMIN 4.4 05/15/2023    AST 32 05/15/2023    ALT 41 05/15/2023     Lab Results   Component Value Date    HGBA1C 5.80 (H) 10/04/2021     Lab Results   Component Value Date    LDL 99 03/08/2022     UA no protein (3/2022)    HIV Labs:  CD4 - 224/8% (5/2023)  VL - <20 copies (5/2023)  Genotype - wild-type (12/2016)  Hep A naive (7/2017) but finished vaccine series in 2018  Hep B sAb positive after vaccination (7/2019)  Hep C Ab - negative (3/2021)  Tspot negative (3/2021)  RPR non-reactive (3/2022)  Urine GC/CT NAAT: negative (3/2021)    Assessment & Plan   1. HIV  -continue Descovy (TAF/FTC)  mg and Tivicay (DTG) 50 mg PO daily  -[facility]  -Labs today: CBC, CMP, HIV RNA (viral load), and CD4 count    2. Morbid obesity BMI 45  -weight loss seems to have plateaued since last visit    3. Fatty liver disease  -CMP today    4. Long term use of antibiotic  -labs as above    RTC 6 months or sooner if needed.

## 2024-01-03 ENCOUNTER — TELEPHONE (OUTPATIENT)
Dept: INFECTIOUS DISEASES | Facility: CLINIC | Age: 59
End: 2024-01-03
Payer: COMMERCIAL

## 2024-01-03 NOTE — TELEPHONE ENCOUNTER
Voice message left for patient that he had some outstanding labs that Dr. Holm had ordered w/ his last office visit that still need to be collected and that he could come to the [facility] outpatient lab to have that obtained. I left our office number for him if he had any questions.

## 2024-01-08 ENCOUNTER — LAB (OUTPATIENT)
Dept: LAB | Facility: HOSPITAL | Age: 59
End: 2024-01-08
Payer: COMMERCIAL

## 2024-01-08 LAB
ALBUMIN SERPL-MCNC: 4.3 G/DL (ref 3.5–5.2)
ALBUMIN/GLOB SERPL: 1.3 G/DL
ALP SERPL-CCNC: 50 U/L (ref 39–117)
ALT SERPL W P-5'-P-CCNC: 42 U/L (ref 1–41)
ANION GAP SERPL CALCULATED.3IONS-SCNC: 9 MMOL/L (ref 5–15)
AST SERPL-CCNC: 31 U/L (ref 1–40)
BASOPHILS # BLD AUTO: 0.03 10*3/MM3 (ref 0–0.2)
BASOPHILS NFR BLD AUTO: 0.5 % (ref 0–1.5)
BILIRUB SERPL-MCNC: 0.4 MG/DL (ref 0–1.2)
BUN SERPL-MCNC: 16 MG/DL (ref 6–20)
BUN/CREAT SERPL: 13.7 (ref 7–25)
CALCIUM SPEC-SCNC: 9.5 MG/DL (ref 8.6–10.5)
CHLORIDE SERPL-SCNC: 103 MMOL/L (ref 98–107)
CO2 SERPL-SCNC: 27 MMOL/L (ref 22–29)
CREAT SERPL-MCNC: 1.17 MG/DL (ref 0.76–1.27)
DEPRECATED RDW RBC AUTO: 44.4 FL (ref 37–54)
EGFRCR SERPLBLD CKD-EPI 2021: 71.8 ML/MIN/1.73
EOSINOPHIL # BLD AUTO: 0.27 10*3/MM3 (ref 0–0.4)
EOSINOPHIL NFR BLD AUTO: 4.6 % (ref 0.3–6.2)
ERYTHROCYTE [DISTWIDTH] IN BLOOD BY AUTOMATED COUNT: 13.8 % (ref 12.3–15.4)
GLOBULIN UR ELPH-MCNC: 3.3 GM/DL
GLUCOSE SERPL-MCNC: 121 MG/DL (ref 65–99)
HCT VFR BLD AUTO: 45.4 % (ref 37.5–51)
HGB BLD-MCNC: 15.8 G/DL (ref 13–17.7)
IMM GRANULOCYTES # BLD AUTO: 0.02 10*3/MM3 (ref 0–0.05)
IMM GRANULOCYTES NFR BLD AUTO: 0.3 % (ref 0–0.5)
LYMPHOCYTES # BLD AUTO: 2.18 10*3/MM3 (ref 0.7–3.1)
LYMPHOCYTES NFR BLD AUTO: 37.1 % (ref 19.6–45.3)
MCH RBC QN AUTO: 30.8 PG (ref 26.6–33)
MCHC RBC AUTO-ENTMCNC: 34.8 G/DL (ref 31.5–35.7)
MCV RBC AUTO: 88.5 FL (ref 79–97)
MONOCYTES # BLD AUTO: 0.43 10*3/MM3 (ref 0.1–0.9)
MONOCYTES NFR BLD AUTO: 7.3 % (ref 5–12)
NEUTROPHILS NFR BLD AUTO: 2.95 10*3/MM3 (ref 1.7–7)
NEUTROPHILS NFR BLD AUTO: 50.2 % (ref 42.7–76)
NRBC BLD AUTO-RTO: 0 /100 WBC (ref 0–0.2)
PLATELET # BLD AUTO: 142 10*3/MM3 (ref 140–450)
PMV BLD AUTO: 9.8 FL (ref 6–12)
POTASSIUM SERPL-SCNC: 4.1 MMOL/L (ref 3.5–5.2)
PROT SERPL-MCNC: 7.6 G/DL (ref 6–8.5)
RBC # BLD AUTO: 5.13 10*6/MM3 (ref 4.14–5.8)
SODIUM SERPL-SCNC: 139 MMOL/L (ref 136–145)
WBC NRBC COR # BLD AUTO: 5.88 10*3/MM3 (ref 3.4–10.8)

## 2024-01-08 PROCEDURE — 80053 COMPREHEN METABOLIC PANEL: CPT | Performed by: INTERNAL MEDICINE

## 2024-01-08 PROCEDURE — 85025 COMPLETE CBC W/AUTO DIFF WBC: CPT | Performed by: INTERNAL MEDICINE

## 2024-01-08 PROCEDURE — 87536 HIV-1 QUANT&REVRSE TRNSCRPJ: CPT | Performed by: INTERNAL MEDICINE

## 2024-01-08 PROCEDURE — 86361 T CELL ABSOLUTE COUNT: CPT | Performed by: INTERNAL MEDICINE

## 2024-01-09 LAB
BASOPHILS # BLD AUTO: 0 X10E3/UL (ref 0–0.2)
BASOPHILS NFR BLD AUTO: 0 %
CD3+CD4+ CELLS # BLD: 154 /UL (ref 359–1519)
CD3+CD4+ CELLS NFR BLD: 7 % (ref 30.8–58.5)
EOSINOPHIL # BLD AUTO: 0.2 X10E3/UL (ref 0–0.4)
EOSINOPHIL NFR BLD AUTO: 4 %
ERYTHROCYTE [DISTWIDTH] IN BLOOD BY AUTOMATED COUNT: 13.2 % (ref 11.6–15.4)
HCT VFR BLD AUTO: 44.8 % (ref 37.5–51)
HGB BLD-MCNC: 15.7 G/DL (ref 13–17.7)
HIV1 RNA # SERPL NAA+PROBE: 30 COPIES/ML
HIV1 RNA SERPL NAA+PROBE-LOG#: 1.48 LOG10COPY/ML
IMM GRANULOCYTES # BLD AUTO: 0 X10E3/UL (ref 0–0.1)
IMM GRANULOCYTES NFR BLD AUTO: 0 %
LYMPHOCYTES # BLD AUTO: 2.2 X10E3/UL (ref 0.7–3.1)
LYMPHOCYTES NFR BLD AUTO: 37 %
MCH RBC QN AUTO: 30.1 PG (ref 26.6–33)
MCHC RBC AUTO-ENTMCNC: 35 G/DL (ref 31.5–35.7)
MCV RBC AUTO: 86 FL (ref 79–97)
MONOCYTES # BLD AUTO: 0.5 X10E3/UL (ref 0.1–0.9)
MONOCYTES NFR BLD AUTO: 8 %
NEUTROPHILS # BLD AUTO: 2.9 X10E3/UL (ref 1.4–7)
NEUTROPHILS NFR BLD AUTO: 51 %
PLATELET # BLD AUTO: 146 X10E3/UL (ref 150–450)
RBC # BLD AUTO: 5.21 X10E6/UL (ref 4.14–5.8)
WBC # BLD AUTO: 5.9 X10E3/UL (ref 3.4–10.8)

## 2024-02-09 ENCOUNTER — OFFICE VISIT (OUTPATIENT)
Dept: INTERNAL MEDICINE | Facility: CLINIC | Age: 59
End: 2024-02-09
Payer: COMMERCIAL

## 2024-02-09 VITALS
DIASTOLIC BLOOD PRESSURE: 74 MMHG | HEART RATE: 74 BPM | HEIGHT: 68 IN | WEIGHT: 312 LBS | OXYGEN SATURATION: 98 % | SYSTOLIC BLOOD PRESSURE: 116 MMHG | BODY MASS INDEX: 47.29 KG/M2

## 2024-02-09 DIAGNOSIS — M54.50 LUMBAR BACK PAIN: Primary | ICD-10-CM

## 2024-02-09 PROCEDURE — 99213 OFFICE O/P EST LOW 20 MIN: CPT | Performed by: STUDENT IN AN ORGANIZED HEALTH CARE EDUCATION/TRAINING PROGRAM

## 2024-02-09 RX ORDER — TIZANIDINE 4 MG/1
TABLET ORAL
COMMUNITY
Start: 2024-02-05

## 2024-02-09 RX ORDER — CELECOXIB 100 MG/1
100 CAPSULE ORAL 2 TIMES DAILY PRN
Qty: 60 CAPSULE | Refills: 1 | Status: SHIPPED | OUTPATIENT
Start: 2024-02-09

## 2024-02-09 NOTE — PROGRESS NOTES
Bhaskar Rich D.O.  Internal Medicine  [facility]  [address], [address]  [place] [address]  [phone]      Chief Complaint  Poss pinch nerve above waistline    SUBJECTIVE    History of Present Illness    Cornelio Viera is a 58 y.o. male who presents to the office today as an established patient that last saw me on 12/9/2021.  Here today to discuss back pain.   This has been going on for 2 weeks, pretty constant since then. The pain is on the left side of the low back and doesn't radiate down the legs. Pain is made worse with twisting of the low back as well as bearing weight on the left leg and bending forward. Pain is 8/10 and sharp/stabbing in nature. He denies loss of bowel or bladder incontinence or saddle anesthesia. No known injuries or accidents. When he is just sitting the pain is minimal.   Went to [facility] on 2/5/24 and had xray and was prescribed tizanidine which helps but makes him drowsy.   He has been taking Theraflu night time in order to get sleep.     No Known Allergies     Outpatient Medications Marked as Taking for the 2/9/24 encounter (Office Visit) with Bhaskar Rich, DO   Medication Sig Dispense Refill    Cyanocobalamin (VITAMIN B 12 PO) Take 500 mg by mouth Daily.      Descovy 200-25 MG per tablet Take 1 tablet by mouth Daily.      oxymetazoline (AFRIN) 0.05 % nasal spray 2 sprays into the nostril(s) as directed by provider 2 (Two) Times a Day.      Tivicay 50 MG tablet Take 1 tablet by mouth Daily.      tiZANidine (ZANAFLEX) 4 MG tablet TAKE 1 TABLET BY MOUTH THREE TIMES DAILY FOR 5 DAYS      Vitamin D, Cholecalciferol, (CHOLECALCIFEROL) 10 MCG (400 UNIT) tablet Take 1 tablet by mouth Daily.          Past Medical History:   Diagnosis Date    Arthritis     knees    Dyspnea on exertion     Hepatic steatosis     HIV (human immunodeficiency virus infection) 2004    likely contracted in 1990    Kaposi sarcoma     Rapid heart rate     Skin cancer      "believes it was basal cell carcinoma     Sleep apnea     using CPAP       OBJECTIVE    Vital Signs:   /74   Pulse 74   Ht 172.7 cm (68\")   Wt (!) 142 kg (312 lb)   SpO2 98%   BMI 47.44 kg/m²     Physical Exam  Vitals reviewed.   Constitutional:       General: He is not in acute distress.     Appearance: He is obese. He is not ill-appearing.   Eyes:      General: No scleral icterus.  Pulmonary:      Effort: Pulmonary effort is normal. No respiratory distress.   Musculoskeletal:        Arms:       Comments: Minimal tenderness to palpation in encircled area of the lower lumber spine with associated muscle tension.     Limited lumbar flexion otherwise grossly normal lumbar ROM.   Neurological:      Mental Status: He is alert.   Psychiatric:         Mood and Affect: Mood normal.         Behavior: Behavior normal.         Thought Content: Thought content normal.                           RADIOLOGY REPORT    FACILITY:  [facility]  UNIT/AGE/GENDER: HERMILO-LY  OP      AGE:58 Y          SEX:M  PATIENT NAME/:  WILMAN STEWART E    1965  UNIT NUMBER:  [id number]  ACCOUNT NUMBER:  [id number]  ACCESSION NUMBER:  [id number]    EXAM: XR L SPINE COMP W OBLIQUES    DATE:  2024    HISTORY: low back pain on the left for 10 days, worse with movements  and twisting, no known injuries, no radicular symptoms    COMPARISON: None    FINDINGS:    Alignment is normal. Vertebral body heights are normal. Intervertebral  disc heights are normal.  There is no acute fracture.    IMPRESSION: No acute abnormality of the lumbar spine.      Dictated by: Herman Pantoja M.D.    Images and Report reviewed and interpreted by: Herman Pantoja M.D.    <PS><Electronically signed by: Herman Pantoja M.D.>  2024    D: 2024  T: 2024      ASSESSMENT & PLAN     Diagnoses and all orders for this visit:    1. Lumbar back pain (Primary)  -pt presents with acute lumbar left sided low back pain as described " above without emergency signs  -he has already gone to [facility] urgent [facility] and I reviewed that office note as well as xray report from that visit   -physical exam as above  -overall most concerning for lumbar sprain vs lumbar herniated disc  -at this point recommended adding in tylenol arthritis 2 tabs up to 3 times daily as needed and will Rx Celebrex as well . Advised him to not use any other NSAID products with Celebrex. Will also refer to physical therapy to begin eval. Pt seemed skeptical of physical therapy but we discussed potential benefits of PT and the need to obtain PT before advanced images.  -he will return to office if this doesn't improve with medication treatment and PT and we can consider referral to ortho at that time   -     celecoxib (CeleBREX) 100 MG capsule; Take 1 capsule by mouth 2 (Two) Times a Day As Needed for Mild Pain.  Dispense: 60 capsule; Refill: 1  -     Ambulatory Referral to Physical Therapy Evaluate and treat            The following social determinates of health impact the patient's medical decision making: No social determinates of health were factored in to today's visit.     Follow Up  Return in about 3 months (around 5/9/2024) for Annual physical.    Patient/family had no further questions at this time and verbalized understanding of the plan discussed today.

## 2024-03-14 ENCOUNTER — OFFICE VISIT (OUTPATIENT)
Dept: INFECTIOUS DISEASES | Facility: CLINIC | Age: 59
End: 2024-03-14
Payer: COMMERCIAL

## 2024-03-14 VITALS
RESPIRATION RATE: 20 BRPM | SYSTOLIC BLOOD PRESSURE: 138 MMHG | HEART RATE: 73 BPM | BODY MASS INDEX: 47.93 KG/M2 | TEMPERATURE: 97.1 F | WEIGHT: 315 LBS | DIASTOLIC BLOOD PRESSURE: 68 MMHG

## 2024-03-14 DIAGNOSIS — Z21 ASYMPTOMATIC HIV INFECTION: Primary | ICD-10-CM

## 2024-03-14 DIAGNOSIS — K76.0 FATTY LIVER DISEASE, NONALCOHOLIC: ICD-10-CM

## 2024-03-14 DIAGNOSIS — E66.01 MORBID OBESITY: ICD-10-CM

## 2024-03-14 DIAGNOSIS — M54.50 LUMBAR BACK PAIN: ICD-10-CM

## 2024-03-14 DIAGNOSIS — Z79.2 LONG TERM (CURRENT) USE OF ANTIBIOTICS: ICD-10-CM

## 2024-03-14 RX ORDER — CELECOXIB 100 MG/1
100 CAPSULE ORAL 2 TIMES DAILY PRN
Qty: 60 CAPSULE | Refills: 1 | Status: SHIPPED | OUTPATIENT
Start: 2024-03-14

## 2024-03-14 NOTE — PROGRESS NOTES
ID CLINIC NOTE    CC: f/u HIV    Subj:  Cornelio Viera is a 58 y.o. male here for f/u HIV care. He continues to tolerate the Descovy (TAF/FTC) and Tivicay (DTG) without any adverse effects. [facility]ills [facility] No cost to him. He occasionally misses a dose.     He has been seeing PT for lower back pain. He hasn't achieved much relief. He's tried acetaminophen and Celebrex.     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 States this med is on hold for now and prefers to not removes from list yet., Disp: , Rfl:     Descovy 200-25 MG per tablet, Take 1 tablet by mouth Daily., Disp: , Rfl:     oxymetazoline (AFRIN) 0.05 % nasal spray, 2 sprays into the nostril(s) as directed by provider 2 (Two) Times a Day., Disp: , Rfl:     Tivicay 50 MG tablet, Take 1 tablet by mouth Daily., Disp: , Rfl:     Vitamin D, Cholecalciferol, (CHOLECALCIFEROL) 10 MCG (400 UNIT) tablet, Take 1 tablet by mouth Daily., Disp: , Rfl:     celecoxib (CeleBREX) 100 MG capsule, TAKE 1 CAPSULE BY MOUTH TWICE DAILY AS NEEDED FOR MILD PAIN, Disp: 60 capsule, Rfl: 1      Objective   Vital Signs   /68   Pulse 73   Temp 97.1 °F (36.2 °C)   Resp 20   Wt (!) 143 kg (315 lb 3.2 oz)   BMI 47.93 kg/m²       Physical Exam:   General: awake, alert, very nice  Eyes: no scleral icterus  Cardiovascular: NR  Respiratory: normal work of breathing on room air  GI: Abdomen is obese, not distended, not tender  Skin:  No rashes  Neurological: Alert and oriented x 3  Psychiatric: Normal mood and affect     Labs:   CBC, CMP, CD4 count, HIV RNA reviewed today  Lab Results   Component Value Date    WBC 5.9 01/08/2024    WBC 5.88 01/08/2024    HGB 15.7 01/08/2024    HGB 15.8 01/08/2024    HCT 44.8 01/08/2024    HCT 45.4 01/08/2024    MCV 86 01/08/2024    MCV 88.5 01/08/2024     (L) 01/08/2024     01/08/2024     Lab Results   Component Value Date    GLUCOSE 121 (H) 01/08/2024    BUN 16 01/08/2024    CREATININE 1.17 01/08/2024    EGFRIFNONA 74 09/07/2021    BCR 13.7 01/08/2024    K 4.1 01/08/2024    CO2 27.0 01/08/2024    CALCIUM 9.5 01/08/2024    ALBUMIN 4.3 01/08/2024    AST 31 01/08/2024    ALT 42 (H) 01/08/2024     Lab Results   Component Value Date    HGBA1C 5.80 (H) 10/04/2021     Lab Results   Component Value Date    LDL 99 03/08/2022     HIV Labs:  CD4 - 154/7% (1/2024)  VL - 30 copies (1/2024)  Genotype - wild-type (12/2016)  Hep A naive (7/2017) but finished vaccine series in 2018  Hep B sAb positive after vaccination (7/2019)  Hep C Ab - negative (3/2021)  Tspot negative (3/2021)  RPR non-reactive (3/2022)  Urine GC/CT NAAT: negative (3/2021)    Assessment & Plan   1. HIV  -continue Descovy (TAF/FTC)  mg and Tivicay (DTG) 50 mg PO daily  -Encouraged 100% compliance; ideally would not miss any doses  -[facility]  -Labs today: CBC, CMP, HIV RNA, and CD4 count    2. Morbid obesity BMI 47    3. Fatty liver disease  -CMP today    4. Long term use of antibiotic  -labs as above    RTC 6 months or sooner if needed.

## 2024-07-09 ENCOUNTER — TELEPHONE (OUTPATIENT)
Dept: INFECTIOUS DISEASES | Facility: CLINIC | Age: 59
End: 2024-07-09
Payer: COMMERCIAL

## 2024-07-09 NOTE — TELEPHONE ENCOUNTER
Voice message left for patient to remind him about the labs that Dr. Holm had placed to have done and that he could have them done at [facility] outpatient lab at this convenience. I left our office number for patient for any questions.

## 2024-08-13 RX ORDER — EMTRICITABINE AND TENOFOVIR ALAFENAMIDE 200; 25 MG/1; MG/1
1 TABLET ORAL DAILY
Qty: 30 TABLET | Refills: 5 | Status: SHIPPED | OUTPATIENT
Start: 2024-08-13

## 2024-08-13 RX ORDER — DOLUTEGRAVIR SODIUM 50 MG/1
50 TABLET, FILM COATED ORAL DAILY
Qty: 30 TABLET | Refills: 5 | Status: SHIPPED | OUTPATIENT
Start: 2024-08-13

## 2024-08-27 ENCOUNTER — TELEPHONE (OUTPATIENT)
Dept: INFECTIOUS DISEASES | Facility: CLINIC | Age: 59
End: 2024-08-27
Payer: COMMERCIAL

## 2024-08-27 NOTE — TELEPHONE ENCOUNTER
Message left to remind patient of lab work that Dr. Holm had ordered that he could get prior to his appt coming up. Our office # was left for any questions patient may have.

## 2024-09-09 ENCOUNTER — LAB (OUTPATIENT)
Dept: LAB | Facility: HOSPITAL | Age: 59
End: 2024-09-09
Payer: COMMERCIAL

## 2024-09-09 LAB
ALBUMIN SERPL-MCNC: 4.1 G/DL (ref 3.5–5.2)
ALBUMIN/GLOB SERPL: 1.2 G/DL
ALP SERPL-CCNC: 61 U/L (ref 39–117)
ALT SERPL W P-5'-P-CCNC: 115 U/L (ref 1–41)
ANION GAP SERPL CALCULATED.3IONS-SCNC: 9 MMOL/L (ref 5–15)
AST SERPL-CCNC: 88 U/L (ref 1–40)
BASOPHILS # BLD AUTO: 0.03 10*3/MM3 (ref 0–0.2)
BASOPHILS NFR BLD AUTO: 0.4 % (ref 0–1.5)
BILIRUB SERPL-MCNC: 0.5 MG/DL (ref 0–1.2)
BUN SERPL-MCNC: 16 MG/DL (ref 6–20)
BUN/CREAT SERPL: 15.4 (ref 7–25)
CALCIUM SPEC-SCNC: 9.4 MG/DL (ref 8.6–10.5)
CHLORIDE SERPL-SCNC: 103 MMOL/L (ref 98–107)
CO2 SERPL-SCNC: 26 MMOL/L (ref 22–29)
CREAT SERPL-MCNC: 1.04 MG/DL (ref 0.76–1.27)
DEPRECATED RDW RBC AUTO: 43.7 FL (ref 37–54)
EGFRCR SERPLBLD CKD-EPI 2021: 82.7 ML/MIN/1.73
EOSINOPHIL # BLD AUTO: 0.7 10*3/MM3 (ref 0–0.4)
EOSINOPHIL NFR BLD AUTO: 8.8 % (ref 0.3–6.2)
ERYTHROCYTE [DISTWIDTH] IN BLOOD BY AUTOMATED COUNT: 13.5 % (ref 12.3–15.4)
GLOBULIN UR ELPH-MCNC: 3.4 GM/DL
GLUCOSE SERPL-MCNC: 145 MG/DL (ref 65–99)
HCT VFR BLD AUTO: 43.9 % (ref 37.5–51)
HGB BLD-MCNC: 14.9 G/DL (ref 13–17.7)
IMM GRANULOCYTES # BLD AUTO: 0.03 10*3/MM3 (ref 0–0.05)
IMM GRANULOCYTES NFR BLD AUTO: 0.4 % (ref 0–0.5)
LYMPHOCYTES # BLD AUTO: 3.12 10*3/MM3 (ref 0.7–3.1)
LYMPHOCYTES NFR BLD AUTO: 39.2 % (ref 19.6–45.3)
MCH RBC QN AUTO: 30.2 PG (ref 26.6–33)
MCHC RBC AUTO-ENTMCNC: 33.9 G/DL (ref 31.5–35.7)
MCV RBC AUTO: 89 FL (ref 79–97)
MONOCYTES # BLD AUTO: 0.47 10*3/MM3 (ref 0.1–0.9)
MONOCYTES NFR BLD AUTO: 5.9 % (ref 5–12)
NEUTROPHILS NFR BLD AUTO: 3.61 10*3/MM3 (ref 1.7–7)
NEUTROPHILS NFR BLD AUTO: 45.3 % (ref 42.7–76)
PLATELET # BLD AUTO: 142 10*3/MM3 (ref 140–450)
PMV BLD AUTO: 10.2 FL (ref 6–12)
POTASSIUM SERPL-SCNC: 4.1 MMOL/L (ref 3.5–5.2)
PROT SERPL-MCNC: 7.5 G/DL (ref 6–8.5)
RBC # BLD AUTO: 4.93 10*6/MM3 (ref 4.14–5.8)
SODIUM SERPL-SCNC: 138 MMOL/L (ref 136–145)
WBC NRBC COR # BLD AUTO: 7.96 10*3/MM3 (ref 3.4–10.8)

## 2024-09-09 PROCEDURE — 80053 COMPREHEN METABOLIC PANEL: CPT | Performed by: INTERNAL MEDICINE

## 2024-09-09 PROCEDURE — 86361 T CELL ABSOLUTE COUNT: CPT | Performed by: INTERNAL MEDICINE

## 2024-09-09 PROCEDURE — 87536 HIV-1 QUANT&REVRSE TRNSCRPJ: CPT | Performed by: INTERNAL MEDICINE

## 2024-09-10 ENCOUNTER — TELEPHONE (OUTPATIENT)
Dept: INFECTIOUS DISEASES | Facility: CLINIC | Age: 59
End: 2024-09-10
Payer: COMMERCIAL

## 2024-09-10 LAB
BASOPHILS # BLD AUTO: 0 X10E3/UL (ref 0–0.2)
BASOPHILS NFR BLD AUTO: 1 %
CD3+CD4+ CELLS # BLD: 191 /UL (ref 359–1519)
CD3+CD4+ CELLS NFR BLD: 5.8 % (ref 30.8–58.5)
EOSINOPHIL # BLD AUTO: 0.7 X10E3/UL (ref 0–0.4)
EOSINOPHIL NFR BLD AUTO: 9 %
ERYTHROCYTE [DISTWIDTH] IN BLOOD BY AUTOMATED COUNT: 13.1 % (ref 11.6–15.4)
HCT VFR BLD AUTO: 44 % (ref 37.5–51)
HGB BLD-MCNC: 14.8 G/DL (ref 13–17.7)
HIV1 RNA # SERPL NAA+PROBE: 220 COPIES/ML
HIV1 RNA SERPL NAA+PROBE-LOG#: 2.34 LOG10COPY/ML
IMM GRANULOCYTES # BLD AUTO: 0 X10E3/UL (ref 0–0.1)
IMM GRANULOCYTES NFR BLD AUTO: 0 %
LYMPHOCYTES # BLD AUTO: 3.3 X10E3/UL (ref 0.7–3.1)
LYMPHOCYTES NFR BLD AUTO: 41 %
MCH RBC QN AUTO: 30.1 PG (ref 26.6–33)
MCHC RBC AUTO-ENTMCNC: 33.6 G/DL (ref 31.5–35.7)
MCV RBC AUTO: 89 FL (ref 79–97)
MONOCYTES # BLD AUTO: 0.5 X10E3/UL (ref 0.1–0.9)
MONOCYTES NFR BLD AUTO: 6 %
NEUTROPHILS # BLD AUTO: 3.6 X10E3/UL (ref 1.4–7)
NEUTROPHILS NFR BLD AUTO: 43 %
PLATELET # BLD AUTO: 146 X10E3/UL (ref 150–450)
RBC # BLD AUTO: 4.92 X10E6/UL (ref 4.14–5.8)
WBC # BLD AUTO: 8 X10E3/UL (ref 3.4–10.8)

## 2024-09-10 NOTE — TELEPHONE ENCOUNTER
Called patient regarding rescheduling appt from 9/16/ to 9/20 no answer left message with updated appt information and call back number to confirm or reschedule if needed

## 2024-09-20 ENCOUNTER — OFFICE VISIT (OUTPATIENT)
Dept: INFECTIOUS DISEASES | Facility: CLINIC | Age: 59
End: 2024-09-20
Payer: COMMERCIAL

## 2024-09-20 VITALS
BODY MASS INDEX: 47.93 KG/M2 | HEART RATE: 80 BPM | TEMPERATURE: 97.1 F | WEIGHT: 315 LBS | SYSTOLIC BLOOD PRESSURE: 114 MMHG | DIASTOLIC BLOOD PRESSURE: 69 MMHG | RESPIRATION RATE: 20 BRPM

## 2024-09-20 DIAGNOSIS — Z21 ASYMPTOMATIC HIV INFECTION: Primary | ICD-10-CM

## 2024-09-20 DIAGNOSIS — Z79.2 LONG TERM (CURRENT) USE OF ANTIBIOTICS: ICD-10-CM

## 2024-09-20 DIAGNOSIS — E66.01 MORBID OBESITY: ICD-10-CM

## 2024-10-22 ENCOUNTER — LAB (OUTPATIENT)
Dept: LAB | Facility: HOSPITAL | Age: 59
End: 2024-10-22
Payer: COMMERCIAL

## 2024-10-22 DIAGNOSIS — Z21 ASYMPTOMATIC HIV INFECTION: ICD-10-CM

## 2024-10-22 LAB
ANION GAP SERPL CALCULATED.3IONS-SCNC: 8.4 MMOL/L (ref 5–15)
BASOPHILS # BLD AUTO: 0.03 10*3/MM3 (ref 0–0.2)
BASOPHILS NFR BLD AUTO: 0.5 % (ref 0–1.5)
BUN SERPL-MCNC: 11 MG/DL (ref 6–20)
BUN/CREAT SERPL: 10.5 (ref 7–25)
CALCIUM SPEC-SCNC: 9.6 MG/DL (ref 8.6–10.5)
CHLORIDE SERPL-SCNC: 101 MMOL/L (ref 98–107)
CO2 SERPL-SCNC: 25.6 MMOL/L (ref 22–29)
CREAT SERPL-MCNC: 1.05 MG/DL (ref 0.76–1.27)
DEPRECATED RDW RBC AUTO: 44.3 FL (ref 37–54)
EGFRCR SERPLBLD CKD-EPI 2021: 81.8 ML/MIN/1.73
EOSINOPHIL # BLD AUTO: 0.28 10*3/MM3 (ref 0–0.4)
EOSINOPHIL NFR BLD AUTO: 4.7 % (ref 0.3–6.2)
ERYTHROCYTE [DISTWIDTH] IN BLOOD BY AUTOMATED COUNT: 14.1 % (ref 12.3–15.4)
GLUCOSE SERPL-MCNC: 138 MG/DL (ref 65–99)
HCT VFR BLD AUTO: 43.2 % (ref 37.5–51)
HGB BLD-MCNC: 14.8 G/DL (ref 13–17.7)
IMM GRANULOCYTES # BLD AUTO: 0.03 10*3/MM3 (ref 0–0.05)
IMM GRANULOCYTES NFR BLD AUTO: 0.5 % (ref 0–0.5)
LYMPHOCYTES # BLD AUTO: 2.45 10*3/MM3 (ref 0.7–3.1)
LYMPHOCYTES NFR BLD AUTO: 41.3 % (ref 19.6–45.3)
MCH RBC QN AUTO: 29.9 PG (ref 26.6–33)
MCHC RBC AUTO-ENTMCNC: 34.3 G/DL (ref 31.5–35.7)
MCV RBC AUTO: 87.3 FL (ref 79–97)
MONOCYTES # BLD AUTO: 0.5 10*3/MM3 (ref 0.1–0.9)
MONOCYTES NFR BLD AUTO: 8.4 % (ref 5–12)
NEUTROPHILS NFR BLD AUTO: 2.64 10*3/MM3 (ref 1.7–7)
NEUTROPHILS NFR BLD AUTO: 44.6 % (ref 42.7–76)
NRBC BLD AUTO-RTO: 0 /100 WBC (ref 0–0.2)
PLATELET # BLD AUTO: 148 10*3/MM3 (ref 140–450)
PMV BLD AUTO: 9.4 FL (ref 6–12)
POTASSIUM SERPL-SCNC: 4.4 MMOL/L (ref 3.5–5.2)
RBC # BLD AUTO: 4.95 10*6/MM3 (ref 4.14–5.8)
SODIUM SERPL-SCNC: 135 MMOL/L (ref 136–145)
WBC NRBC COR # BLD AUTO: 5.93 10*3/MM3 (ref 3.4–10.8)

## 2024-10-22 PROCEDURE — 85025 COMPLETE CBC W/AUTO DIFF WBC: CPT

## 2024-10-22 PROCEDURE — 80048 BASIC METABOLIC PNL TOTAL CA: CPT

## 2024-10-22 PROCEDURE — 86361 T CELL ABSOLUTE COUNT: CPT

## 2024-10-22 PROCEDURE — 87536 HIV-1 QUANT&REVRSE TRNSCRPJ: CPT

## 2024-10-22 PROCEDURE — 36415 COLL VENOUS BLD VENIPUNCTURE: CPT

## 2024-10-23 LAB
HIV1 RNA # SERPL NAA+PROBE: <20 COPIES/ML
HIV1 RNA SERPL NAA+PROBE-LOG#: NORMAL LOG10COPY/ML

## 2024-10-24 LAB
BASOPHILS # BLD AUTO: 0 X10E3/UL (ref 0–0.2)
BASOPHILS NFR BLD AUTO: 1 %
CD3+CD4+ CELLS # BLD: 160 /UL (ref 359–1519)
CD3+CD4+ CELLS NFR BLD: 6.4 % (ref 30.8–58.5)
EOSINOPHIL # BLD AUTO: 0.3 X10E3/UL (ref 0–0.4)
EOSINOPHIL NFR BLD AUTO: 5 %
ERYTHROCYTE [DISTWIDTH] IN BLOOD BY AUTOMATED COUNT: 14.1 % (ref 11.6–15.4)
HCT VFR BLD AUTO: 43.6 % (ref 37.5–51)
HGB BLD-MCNC: 14.9 G/DL (ref 13–17.7)
IMM GRANULOCYTES # BLD AUTO: 0 X10E3/UL (ref 0–0.1)
IMM GRANULOCYTES NFR BLD AUTO: 1 %
LYMPHOCYTES # BLD AUTO: 2.5 X10E3/UL (ref 0.7–3.1)
LYMPHOCYTES NFR BLD AUTO: 42 %
MCH RBC QN AUTO: 30.7 PG (ref 26.6–33)
MCHC RBC AUTO-ENTMCNC: 34.2 G/DL (ref 31.5–35.7)
MCV RBC AUTO: 90 FL (ref 79–97)
MONOCYTES # BLD AUTO: 0.5 X10E3/UL (ref 0.1–0.9)
MONOCYTES NFR BLD AUTO: 8 %
NEUTROPHILS # BLD AUTO: 2.6 X10E3/UL (ref 1.4–7)
NEUTROPHILS NFR BLD AUTO: 43 %
PLATELET # BLD AUTO: 155 X10E3/UL (ref 150–450)
RBC # BLD AUTO: 4.85 X10E6/UL (ref 4.14–5.8)
WBC # BLD AUTO: 6 X10E3/UL (ref 3.4–10.8)

## 2025-01-24 RX ORDER — DOLUTEGRAVIR SODIUM 50 MG/1
50 TABLET, FILM COATED ORAL DAILY
Qty: 30 TABLET | Refills: 5 | Status: SHIPPED | OUTPATIENT
Start: 2025-01-24

## 2025-01-24 RX ORDER — EMTRICITABINE AND TENOFOVIR ALAFENAMIDE 200; 25 MG/1; MG/1
1 TABLET ORAL DAILY
Qty: 30 TABLET | Refills: 5 | Status: SHIPPED | OUTPATIENT
Start: 2025-01-24

## 2025-03-17 ENCOUNTER — OFFICE VISIT (OUTPATIENT)
Dept: INFECTIOUS DISEASES | Facility: CLINIC | Age: 60
End: 2025-03-17
Payer: COMMERCIAL

## 2025-03-17 ENCOUNTER — LAB (OUTPATIENT)
Dept: LAB | Facility: HOSPITAL | Age: 60
End: 2025-03-17
Payer: COMMERCIAL

## 2025-03-17 VITALS
BODY MASS INDEX: 48.17 KG/M2 | RESPIRATION RATE: 16 BRPM | WEIGHT: 315 LBS | SYSTOLIC BLOOD PRESSURE: 129 MMHG | TEMPERATURE: 97.3 F | DIASTOLIC BLOOD PRESSURE: 76 MMHG | HEART RATE: 76 BPM

## 2025-03-17 DIAGNOSIS — E66.01 MORBID OBESITY: ICD-10-CM

## 2025-03-17 DIAGNOSIS — Z21 ASYMPTOMATIC HIV INFECTION: Primary | ICD-10-CM

## 2025-03-17 DIAGNOSIS — Z79.2 LONG TERM (CURRENT) USE OF ANTIBIOTICS: ICD-10-CM

## 2025-03-17 LAB
ALBUMIN SERPL-MCNC: 3.9 G/DL (ref 3.5–5.2)
ALBUMIN/GLOB SERPL: 1 G/DL
ALP SERPL-CCNC: 71 U/L (ref 39–117)
ALT SERPL W P-5'-P-CCNC: 103 U/L (ref 1–41)
ANION GAP SERPL CALCULATED.3IONS-SCNC: 11 MMOL/L (ref 5–15)
AST SERPL-CCNC: 85 U/L (ref 1–40)
BASOPHILS # BLD AUTO: 0.03 10*3/MM3 (ref 0–0.2)
BASOPHILS NFR BLD AUTO: 0.5 % (ref 0–1.5)
BILIRUB SERPL-MCNC: 0.6 MG/DL (ref 0–1.2)
BUN SERPL-MCNC: 11 MG/DL (ref 8–23)
BUN/CREAT SERPL: 12.4 (ref 7–25)
CALCIUM SPEC-SCNC: 9 MG/DL (ref 8.6–10.5)
CHLORIDE SERPL-SCNC: 102 MMOL/L (ref 98–107)
CO2 SERPL-SCNC: 23 MMOL/L (ref 22–29)
CREAT SERPL-MCNC: 0.89 MG/DL (ref 0.76–1.27)
DEPRECATED RDW RBC AUTO: 44.9 FL (ref 37–54)
EGFRCR SERPLBLD CKD-EPI 2021: 98.1 ML/MIN/1.73
EOSINOPHIL # BLD AUTO: 0.19 10*3/MM3 (ref 0–0.4)
EOSINOPHIL NFR BLD AUTO: 3 % (ref 0.3–6.2)
ERYTHROCYTE [DISTWIDTH] IN BLOOD BY AUTOMATED COUNT: 14.1 % (ref 12.3–15.4)
GLOBULIN UR ELPH-MCNC: 3.8 GM/DL
GLUCOSE SERPL-MCNC: 154 MG/DL (ref 65–99)
HCT VFR BLD AUTO: 43.7 % (ref 37.5–51)
HGB BLD-MCNC: 15.1 G/DL (ref 13–17.7)
IMM GRANULOCYTES # BLD AUTO: 0.02 10*3/MM3 (ref 0–0.05)
IMM GRANULOCYTES NFR BLD AUTO: 0.3 % (ref 0–0.5)
LYMPHOCYTES # BLD AUTO: 2.92 10*3/MM3 (ref 0.7–3.1)
LYMPHOCYTES NFR BLD AUTO: 45.5 % (ref 19.6–45.3)
MCH RBC QN AUTO: 30.1 PG (ref 26.6–33)
MCHC RBC AUTO-ENTMCNC: 34.6 G/DL (ref 31.5–35.7)
MCV RBC AUTO: 87.1 FL (ref 79–97)
MONOCYTES # BLD AUTO: 0.55 10*3/MM3 (ref 0.1–0.9)
MONOCYTES NFR BLD AUTO: 8.6 % (ref 5–12)
NEUTROPHILS NFR BLD AUTO: 2.71 10*3/MM3 (ref 1.7–7)
NEUTROPHILS NFR BLD AUTO: 42.1 % (ref 42.7–76)
PLATELET # BLD AUTO: 137 10*3/MM3 (ref 140–450)
PMV BLD AUTO: 9.7 FL (ref 6–12)
POTASSIUM SERPL-SCNC: 3.8 MMOL/L (ref 3.5–5.2)
PROT SERPL-MCNC: 7.7 G/DL (ref 6–8.5)
RBC # BLD AUTO: 5.02 10*6/MM3 (ref 4.14–5.8)
SODIUM SERPL-SCNC: 136 MMOL/L (ref 136–145)
WBC NRBC COR # BLD AUTO: 6.42 10*3/MM3 (ref 3.4–10.8)

## 2025-03-17 PROCEDURE — 85025 COMPLETE CBC W/AUTO DIFF WBC: CPT | Performed by: INTERNAL MEDICINE

## 2025-03-17 PROCEDURE — 36415 COLL VENOUS BLD VENIPUNCTURE: CPT | Performed by: INTERNAL MEDICINE

## 2025-03-17 PROCEDURE — 87536 HIV-1 QUANT&REVRSE TRNSCRPJ: CPT | Performed by: INTERNAL MEDICINE

## 2025-03-17 PROCEDURE — 86361 T CELL ABSOLUTE COUNT: CPT | Performed by: INTERNAL MEDICINE

## 2025-03-17 PROCEDURE — 80053 COMPREHEN METABOLIC PANEL: CPT | Performed by: INTERNAL MEDICINE

## 2025-03-17 RX ORDER — DOXYCYCLINE 100 MG/1
100 CAPSULE ORAL
COMMUNITY
Start: 2025-03-05 | End: 2025-03-19

## 2025-03-17 NOTE — PROGRESS NOTES
ID CLINIC NOTE    CC: f/u HIV    Subj:  Cornelio Viera is a 59 y.o. male here for f/u HIV care.  He remains on Tivicay and Descovy.  His adherence with therapy is much improved compared to our list.     He continues to deal with lower back pain. He has had x-ray and MRI performed. He is scheduled to see [facility] [place], operated by [facility] later this week.     He is still going through the disability process which has  been stressful. He retired last month.    He was recently RX'ed doxycycline, inhaler, and Flonase for bronchitis. He is improving. He has a few doses left. This was through [facility].     PMH:  HIV/AIDS on ART  Kaposi's sarcoma s/p chemotherapy many years ago  Melanoma s/p removal  Hematochezia  Osteoarthritis  MIAN w/ CPAP  Cellulitis  Morbid obesity BMI 48  Lower back pain    Past Surgical History:   Procedure Laterality Date    TOENAIL EXCISION      multiple from infections    WISDOM TOOTH EXTRACTION  1983     Social History:  No tob/EtOH/illicits  Now retired (2025)  Pet dogs at home  Not sexually active at this time    Allergies:  None    Medications:    Current Outpatient Medications:     celecoxib (CeleBREX) 100 MG capsule, TAKE 1 CAPSULE BY MOUTH TWICE DAILY AS NEEDED FOR MILD PAIN, Disp: 60 capsule, Rfl: 1    Descovy 200-25 MG per tablet, TAKE 1 TABLET BY MOUTH EVERY DAY, Disp: 30 tablet, Rfl: 5    doxycycline (VIBRAMYCIN) 100 MG capsule, Take 1 capsule by mouth., Disp: , Rfl:     oxymetazoline (AFRIN) 0.05 % nasal spray, Administer 2 sprays into the nostril(s) as directed by provider As Needed., Disp: , Rfl:     Tivicay 50 MG tablet, TAKE 1 TABLET BY MOUTH EVERY DAY, Disp: 30 tablet, Rfl: 5      Objective   Vital Signs   /76   Pulse 76   Temp 97.3 °F (36.3 °C)   Resp 16   Wt (!) 144 kg (316 lb 12.8 oz)   BMI 48.17 kg/m²     Physical Exam:   General: awake, alert, very nice, NAD, sitting in chair  Eyes: no scleral icterus  Cardiovascular: Normal rate  Respiratory: normal work of breathing on  ambient air w/o wheezing  GI: Abdomen is obese, not distended, not tender  Skin: No rashes on exposed areas  Neurological: Alert and oriented x 3  Psychiatric: Normal mood and affect     Labs:   CBC, CMP, CD4 count, and HIV RNA reviewed today  Lab Results   Component Value Date    WBC 7 01/20/2025    HGB 16 01/20/2025    HCT 46.8 01/20/2025    MCV 88.9 01/20/2025     (L) 01/20/2025     Lab Results   Component Value Date    GLUCOSE 138 (H) 10/22/2024    CALCIUM 9.6 10/22/2024     (L) 10/22/2024    K 4.4 10/22/2024    CO2 25.6 10/22/2024     10/22/2024    BUN 11 10/22/2024    CREATININE 1.05 10/22/2024    EGFR 81.8 10/22/2024    BCR 10.5 10/22/2024    ANIONGAP 8.4 10/22/2024     Lab Results   Component Value Date     (H) 09/09/2024     Lab Results   Component Value Date    HGBA1C 5.80 (H) 10/04/2021     Lab Results   Component Value Date    .8 (H) 01/20/2025     HIV Labs:  CD4 - 160/6% (10/2024)  VL - <20 copies (10/2024)  Genotype - wild-type (12/2016)  Hep A naive (7/2017) but finished vaccine series in 2018  Hep B sAb positive after vaccination (7/2019)  Hep C Ab - negative (3/2021)  Tspot negative (3/2021)  RPR non-reactive (3/2022)  Urine GC/CT NAAT: negative (3/2021)    Assessment & Plan   1. HIV  -Discussed importance of adherence to Descovy (TAF/FTC)  mg and Tivicay (DTG) 50 mg PO daily; Rx to [facility]  -Encouraged him to involve his partner in reminding him to take his ART daily and to set a timer on his phone to remind him to take his ART daily  -Check CBC, CMP, CD4 count, and HIV viral load today    2. Morbid obesity BMI 48    3. Fatty liver disease    4.  Chronic back pain  -Follows in pain clinic    5. Long term use of antibiotic  -labs as above    6.  Depression    RTC 6 months or sooner if needed.

## 2025-03-18 LAB
BASOPHILS # BLD AUTO: 0 X10E3/UL (ref 0–0.2)
BASOPHILS NFR BLD AUTO: 1 %
CD3+CD4+ CELLS # BLD: 162 /UL (ref 359–1519)
CD3+CD4+ CELLS NFR BLD: 5.6 % (ref 30.8–58.5)
EOSINOPHIL # BLD AUTO: 0.2 X10E3/UL (ref 0–0.4)
EOSINOPHIL NFR BLD AUTO: 3 %
ERYTHROCYTE [DISTWIDTH] IN BLOOD BY AUTOMATED COUNT: 14.3 % (ref 11.6–15.4)
HCT VFR BLD AUTO: 44.9 % (ref 37.5–51)
HGB BLD-MCNC: 15.3 G/DL (ref 13–17.7)
HIV1 RNA # SERPL NAA+PROBE: 40 COPIES/ML
HIV1 RNA SERPL NAA+PROBE-LOG#: 1.6 LOG10COPY/ML
IMM GRANULOCYTES # BLD AUTO: 0 X10E3/UL (ref 0–0.1)
IMM GRANULOCYTES NFR BLD AUTO: 0 %
LYMPHOCYTES # BLD AUTO: 2.9 X10E3/UL (ref 0.7–3.1)
LYMPHOCYTES NFR BLD AUTO: 45 %
MCH RBC QN AUTO: 30.1 PG (ref 26.6–33)
MCHC RBC AUTO-ENTMCNC: 34.1 G/DL (ref 31.5–35.7)
MCV RBC AUTO: 88 FL (ref 79–97)
MONOCYTES # BLD AUTO: 0.5 X10E3/UL (ref 0.1–0.9)
MONOCYTES NFR BLD AUTO: 8 %
NEUTROPHILS # BLD AUTO: 2.8 X10E3/UL (ref 1.4–7)
NEUTROPHILS NFR BLD AUTO: 43 %
PLATELET # BLD AUTO: 127 X10E3/UL (ref 150–450)
RBC # BLD AUTO: 5.08 X10E6/UL (ref 4.14–5.8)
WBC # BLD AUTO: 6.5 X10E3/UL (ref 3.4–10.8)

## 2025-03-19 ENCOUNTER — OFFICE VISIT (OUTPATIENT)
Dept: PAIN MEDICINE | Facility: CLINIC | Age: 60
End: 2025-03-19
Payer: COMMERCIAL

## 2025-03-19 ENCOUNTER — TRANSCRIBE ORDERS (OUTPATIENT)
Dept: SURGERY | Facility: SURGERY CENTER | Age: 60
End: 2025-03-19
Payer: COMMERCIAL

## 2025-03-19 ENCOUNTER — PREP FOR SURGERY (OUTPATIENT)
Dept: SURGERY | Facility: SURGERY CENTER | Age: 60
End: 2025-03-19
Payer: COMMERCIAL

## 2025-03-19 VITALS
RESPIRATION RATE: 18 BRPM | HEART RATE: 82 BPM | OXYGEN SATURATION: 97 % | DIASTOLIC BLOOD PRESSURE: 73 MMHG | BODY MASS INDEX: 47.74 KG/M2 | WEIGHT: 315 LBS | SYSTOLIC BLOOD PRESSURE: 144 MMHG | HEIGHT: 68 IN | TEMPERATURE: 98.2 F

## 2025-03-19 DIAGNOSIS — Z41.9 SURGERY, ELECTIVE: Primary | ICD-10-CM

## 2025-03-19 DIAGNOSIS — M51.362 DEGENERATION OF INTERVERTEBRAL DISC OF LUMBAR REGION WITH DISCOGENIC BACK PAIN AND LOWER EXTREMITY PAIN: Primary | ICD-10-CM

## 2025-03-19 DIAGNOSIS — M54.16 LUMBAR RADICULOPATHY: ICD-10-CM

## 2025-03-19 PROCEDURE — 99204 OFFICE O/P NEW MOD 45 MIN: CPT | Performed by: NURSE PRACTITIONER

## 2025-03-19 RX ORDER — FLUTICASONE PROPIONATE 50 MCG
2 SPRAY, SUSPENSION (ML) NASAL DAILY
COMMUNITY
Start: 2025-03-05 | End: 2026-03-05

## 2025-03-19 RX ORDER — ALBUTEROL SULFATE 90 UG/1
2 INHALANT RESPIRATORY (INHALATION) EVERY 6 HOURS PRN
COMMUNITY
Start: 2025-03-05

## 2025-03-24 ENCOUNTER — RESULTS FOLLOW-UP (OUTPATIENT)
Dept: INFECTIOUS DISEASES | Facility: CLINIC | Age: 60
End: 2025-03-24
Payer: COMMERCIAL

## 2025-03-24 NOTE — TELEPHONE ENCOUNTER
Per Dr. Holm's request, I called patient and left a voice message that per Dr. Holm his recent labs were stable. I also told him that Dr. Holm said to keep up the good work and that he looked forward to seeing him again in a few months. I added that patient can call our office and ask for nurse Rhona if he has any questions or needs any specific values.

## 2025-04-14 ENCOUNTER — TELEPHONE (OUTPATIENT)
Dept: PAIN MEDICINE | Facility: CLINIC | Age: 60
End: 2025-04-14

## 2025-04-14 NOTE — TELEPHONE ENCOUNTER
"Caller: Cornelio Viera \"WILMAN\" / PATIENT     Best call back number: [phone]     PATIENT REQUESTS TO CANCEL THIS FRIDAY's 4/18 LUMBAR EPIDURAL     PATIENT DOES NOT WISH TO RESCHEDULE DUE TO FINANCIAL COSTS & POSSIBLY WANTING TO GET A 2nd OPINION     PATIENT INFORMED IF HE CHOOSES TO GET A 2nd OPINION, RECORDS FROM OTHER PROVIDER WILL NEED TO BE SENT FOR REVIEW BY [facility] BEFORE SCHEDULING w/DR GARCIA or JOHNNA KUMAR.     THANKS     "

## 2025-07-07 RX ORDER — DOLUTEGRAVIR SODIUM 50 MG/1
50 TABLET, FILM COATED ORAL DAILY
Qty: 30 TABLET | Refills: 5 | Status: SHIPPED | OUTPATIENT
Start: 2025-07-07

## 2025-07-08 RX ORDER — EMTRICITABINE AND TENOFOVIR ALAFENAMIDE 200; 25 MG/1; MG/1
1 TABLET ORAL DAILY
Qty: 30 TABLET | Refills: 5 | Status: SHIPPED | OUTPATIENT
Start: 2025-07-08